# Patient Record
Sex: FEMALE | Race: OTHER | NOT HISPANIC OR LATINO | Employment: FULL TIME | URBAN - METROPOLITAN AREA
[De-identification: names, ages, dates, MRNs, and addresses within clinical notes are randomized per-mention and may not be internally consistent; named-entity substitution may affect disease eponyms.]

---

## 2022-06-22 ENCOUNTER — HOSPITAL ENCOUNTER (INPATIENT)
Facility: MEDICAL CENTER | Age: 32
LOS: 5 days | End: 2022-06-27
Attending: OBSTETRICS & GYNECOLOGY | Admitting: OBSTETRICS & GYNECOLOGY
Payer: COMMERCIAL

## 2022-06-22 DIAGNOSIS — G89.18 OTHER ACUTE POSTPROCEDURAL PAIN: ICD-10-CM

## 2022-06-22 PROBLEM — Z34.90 INTRAUTERINE PREGNANCY: Status: ACTIVE | Noted: 2022-06-22

## 2022-06-22 LAB
ALBUMIN SERPL BCP-MCNC: 3.6 G/DL (ref 3.2–4.9)
ALBUMIN/GLOB SERPL: 1.3 G/DL
ALP SERPL-CCNC: 152 U/L (ref 30–99)
ALT SERPL-CCNC: 11 U/L (ref 2–50)
ANION GAP SERPL CALC-SCNC: 15 MMOL/L (ref 7–16)
AST SERPL-CCNC: 16 U/L (ref 12–45)
BASOPHILS # BLD AUTO: 0.3 % (ref 0–1.8)
BASOPHILS # BLD: 0.04 K/UL (ref 0–0.12)
BILIRUB SERPL-MCNC: <0.2 MG/DL (ref 0.1–1.5)
BUN SERPL-MCNC: 9 MG/DL (ref 8–22)
CALCIUM SERPL-MCNC: 8 MG/DL (ref 8.5–10.5)
CHLORIDE SERPL-SCNC: 102 MMOL/L (ref 96–112)
CO2 SERPL-SCNC: 19 MMOL/L (ref 20–33)
CREAT SERPL-MCNC: 0.51 MG/DL (ref 0.5–1.4)
EOSINOPHIL # BLD AUTO: 0 K/UL (ref 0–0.51)
EOSINOPHIL NFR BLD: 0 % (ref 0–6.9)
ERYTHROCYTE [DISTWIDTH] IN BLOOD BY AUTOMATED COUNT: 40.1 FL (ref 35.9–50)
GFR SERPLBLD CREATININE-BSD FMLA CKD-EPI: 128 ML/MIN/1.73 M 2
GLOBULIN SER CALC-MCNC: 2.8 G/DL (ref 1.9–3.5)
GLUCOSE BLD STRIP.AUTO-MCNC: 144 MG/DL (ref 65–99)
GLUCOSE BLD STRIP.AUTO-MCNC: 164 MG/DL (ref 65–99)
GLUCOSE BLD STRIP.AUTO-MCNC: 167 MG/DL (ref 65–99)
GLUCOSE BLD STRIP.AUTO-MCNC: 192 MG/DL (ref 65–99)
GLUCOSE BLD STRIP.AUTO-MCNC: 222 MG/DL (ref 65–99)
GLUCOSE SERPL-MCNC: 127 MG/DL (ref 65–99)
HCT VFR BLD AUTO: 41.4 % (ref 37–47)
HGB BLD-MCNC: 13.7 G/DL (ref 12–16)
IMM GRANULOCYTES # BLD AUTO: 0.1 K/UL (ref 0–0.11)
IMM GRANULOCYTES NFR BLD AUTO: 0.6 % (ref 0–0.9)
LYMPHOCYTES # BLD AUTO: 1.38 K/UL (ref 1–4.8)
LYMPHOCYTES NFR BLD: 8.9 % (ref 22–41)
MAGNESIUM SERPL-MCNC: 5.5 MG/DL (ref 1.5–2.5)
MAGNESIUM SERPL-MCNC: 6.1 MG/DL (ref 1.5–2.5)
MAGNESIUM SERPL-MCNC: 7 MG/DL (ref 1.5–2.5)
MCH RBC QN AUTO: 27 PG (ref 27–33)
MCHC RBC AUTO-ENTMCNC: 33.1 G/DL (ref 33.6–35)
MCV RBC AUTO: 81.7 FL (ref 81.4–97.8)
MONOCYTES # BLD AUTO: 0.27 K/UL (ref 0–0.85)
MONOCYTES NFR BLD AUTO: 1.7 % (ref 0–13.4)
NEUTROPHILS # BLD AUTO: 13.69 K/UL (ref 2–7.15)
NEUTROPHILS NFR BLD: 88.5 % (ref 44–72)
NRBC # BLD AUTO: 0 K/UL
NRBC BLD-RTO: 0 /100 WBC
PLATELET # BLD AUTO: 194 K/UL (ref 164–446)
PMV BLD AUTO: 11.3 FL (ref 9–12.9)
POTASSIUM SERPL-SCNC: 3.7 MMOL/L (ref 3.6–5.5)
PROT SERPL-MCNC: 6.4 G/DL (ref 6–8.2)
RBC # BLD AUTO: 5.07 M/UL (ref 4.2–5.4)
SARS-COV-2 RNA RESP QL NAA+PROBE: NOTDETECTED
SODIUM SERPL-SCNC: 136 MMOL/L (ref 135–145)
SPECIMEN SOURCE: NORMAL
T PALLIDUM AB SER QL IA: NORMAL
WBC # BLD AUTO: 15.5 K/UL (ref 4.8–10.8)

## 2022-06-22 PROCEDURE — 302449 STATCHG TRIAGE ONLY (STATISTIC)

## 2022-06-22 PROCEDURE — 700102 HCHG RX REV CODE 250 W/ 637 OVERRIDE(OP): Performed by: OBSTETRICS & GYNECOLOGY

## 2022-06-22 PROCEDURE — U0005 INFEC AGEN DETEC AMPLI PROBE: HCPCS

## 2022-06-22 PROCEDURE — 83735 ASSAY OF MAGNESIUM: CPT

## 2022-06-22 PROCEDURE — 86780 TREPONEMA PALLIDUM: CPT

## 2022-06-22 PROCEDURE — 700105 HCHG RX REV CODE 258: Performed by: OBSTETRICS & GYNECOLOGY

## 2022-06-22 PROCEDURE — 96365 THER/PROPH/DIAG IV INF INIT: CPT

## 2022-06-22 PROCEDURE — 80053 COMPREHEN METABOLIC PANEL: CPT

## 2022-06-22 PROCEDURE — 82962 GLUCOSE BLOOD TEST: CPT

## 2022-06-22 PROCEDURE — 85025 COMPLETE CBC W/AUTO DIFF WBC: CPT

## 2022-06-22 PROCEDURE — 700111 HCHG RX REV CODE 636 W/ 250 OVERRIDE (IP): Performed by: OBSTETRICS & GYNECOLOGY

## 2022-06-22 PROCEDURE — 700111 HCHG RX REV CODE 636 W/ 250 OVERRIDE (IP)

## 2022-06-22 PROCEDURE — A9270 NON-COVERED ITEM OR SERVICE: HCPCS | Performed by: OBSTETRICS & GYNECOLOGY

## 2022-06-22 PROCEDURE — 36415 COLL VENOUS BLD VENIPUNCTURE: CPT

## 2022-06-22 PROCEDURE — U0003 INFECTIOUS AGENT DETECTION BY NUCLEIC ACID (DNA OR RNA); SEVERE ACUTE RESPIRATORY SYNDROME CORONAVIRUS 2 (SARS-COV-2) (CORONAVIRUS DISEASE [COVID-19]), AMPLIFIED PROBE TECHNIQUE, MAKING USE OF HIGH THROUGHPUT TECHNOLOGIES AS DESCRIBED BY CMS-2020-01-R: HCPCS

## 2022-06-22 PROCEDURE — 302790 HCHG STAT ANTEPARTUM CARE, DAILY

## 2022-06-22 PROCEDURE — 770002 HCHG ROOM/CARE - OB PRIVATE (112)

## 2022-06-22 RX ORDER — VITAMIN A ACETATE, BETA CAROTENE, ASCORBIC ACID, CHOLECALCIFEROL, .ALPHA.-TOCOPHEROL ACETATE, DL-, THIAMINE MONONITRATE, RIBOFLAVIN, NIACINAMIDE, PYRIDOXINE HYDROCHLORIDE, FOLIC ACID, CYANOCOBALAMIN, CALCIUM CARBONATE, FERROUS FUMARATE, ZINC OXIDE, CUPRIC OXIDE 3080; 12; 120; 400; 1; 1.84; 3; 20; 22; 920; 25; 200; 27; 10; 2 [IU]/1; UG/1; MG/1; [IU]/1; MG/1; MG/1; MG/1; MG/1; MG/1; [IU]/1; MG/1; MG/1; MG/1; MG/1; MG/1
1 TABLET, FILM COATED ORAL
Status: DISCONTINUED | OUTPATIENT
Start: 2022-06-22 | End: 2022-06-27 | Stop reason: HOSPADM

## 2022-06-22 RX ORDER — VITAMIN A ACETATE, BETA CAROTENE, ASCORBIC ACID, CHOLECALCIFEROL, .ALPHA.-TOCOPHEROL ACETATE, DL-, THIAMINE MONONITRATE, RIBOFLAVIN, NIACINAMIDE, PYRIDOXINE HYDROCHLORIDE, FOLIC ACID, CYANOCOBALAMIN, CALCIUM CARBONATE, FERROUS FUMARATE, ZINC OXIDE, CUPRIC OXIDE 3080; 12; 120; 400; 1; 1.84; 3; 20; 22; 920; 25; 200; 27; 10; 2 [IU]/1; UG/1; MG/1; [IU]/1; MG/1; MG/1; MG/1; MG/1; MG/1; [IU]/1; MG/1; MG/1; MG/1; MG/1; MG/1
1 TABLET, FILM COATED ORAL
Status: DISCONTINUED | OUTPATIENT
Start: 2022-06-22 | End: 2022-06-22

## 2022-06-22 RX ORDER — ONDANSETRON 2 MG/ML
INJECTION INTRAMUSCULAR; INTRAVENOUS
Status: COMPLETED
Start: 2022-06-22 | End: 2022-06-22

## 2022-06-22 RX ORDER — INSULIN LISPRO 100 [IU]/ML
6 INJECTION, SOLUTION INTRAVENOUS; SUBCUTANEOUS ONCE
Status: COMPLETED | OUTPATIENT
Start: 2022-06-22 | End: 2022-06-22

## 2022-06-22 RX ORDER — DOCUSATE SODIUM 100 MG/1
100 CAPSULE, LIQUID FILLED ORAL DAILY
Status: DISCONTINUED | OUTPATIENT
Start: 2022-06-22 | End: 2022-06-23

## 2022-06-22 RX ORDER — ONDANSETRON 2 MG/ML
4 INJECTION INTRAMUSCULAR; INTRAVENOUS EVERY 6 HOURS PRN
Status: DISCONTINUED | OUTPATIENT
Start: 2022-06-22 | End: 2022-06-27 | Stop reason: HOSPADM

## 2022-06-22 RX ORDER — BETAMETHASONE SODIUM PHOSPHATE AND BETAMETHASONE ACETATE 3; 3 MG/ML; MG/ML
12 INJECTION, SUSPENSION INTRA-ARTICULAR; INTRALESIONAL; INTRAMUSCULAR; SOFT TISSUE ONCE
Status: COMPLETED | OUTPATIENT
Start: 2022-06-23 | End: 2022-06-23

## 2022-06-22 RX ORDER — MAGNESIUM SULFATE HEPTAHYDRATE 40 MG/ML
1 INJECTION, SOLUTION INTRAVENOUS CONTINUOUS
Status: DISCONTINUED | OUTPATIENT
Start: 2022-06-22 | End: 2022-06-24

## 2022-06-22 RX ORDER — CALCIUM GLUCONATE 94 MG/ML
1 INJECTION, SOLUTION INTRAVENOUS
Status: DISCONTINUED | OUTPATIENT
Start: 2022-06-22 | End: 2022-06-24

## 2022-06-22 RX ORDER — MENTHOL AND METHYL SALICYLATE 7.6; 29 G/100G; G/100G
OINTMENT TOPICAL PRN
Status: DISCONTINUED | OUTPATIENT
Start: 2022-06-22 | End: 2022-06-27 | Stop reason: HOSPADM

## 2022-06-22 RX ORDER — SODIUM CHLORIDE, SODIUM LACTATE, POTASSIUM CHLORIDE, CALCIUM CHLORIDE 600; 310; 30; 20 MG/100ML; MG/100ML; MG/100ML; MG/100ML
INJECTION, SOLUTION INTRAVENOUS CONTINUOUS
Status: DISCONTINUED | OUTPATIENT
Start: 2022-06-22 | End: 2022-06-25

## 2022-06-22 RX ORDER — DOCUSATE SODIUM 100 MG/1
100 CAPSULE, LIQUID FILLED ORAL 2 TIMES DAILY
Status: DISCONTINUED | OUTPATIENT
Start: 2022-06-22 | End: 2022-06-22

## 2022-06-22 RX ORDER — INSULIN LISPRO 100 [IU]/ML
10 INJECTION, SOLUTION INTRAVENOUS; SUBCUTANEOUS ONCE
Status: COMPLETED | OUTPATIENT
Start: 2022-06-22 | End: 2022-06-22

## 2022-06-22 RX ORDER — AMOXICILLIN 250 MG/1
250 CAPSULE ORAL EVERY 8 HOURS
Status: DISCONTINUED | OUTPATIENT
Start: 2022-06-24 | End: 2022-06-27 | Stop reason: HOSPADM

## 2022-06-22 RX ADMIN — DOXYLAMINE SUCCINATE 25 MG: 25 TABLET ORAL at 20:08

## 2022-06-22 RX ADMIN — ONDANSETRON 4 MG: 2 INJECTION INTRAMUSCULAR; INTRAVENOUS at 11:49

## 2022-06-22 RX ADMIN — DOCUSATE SODIUM 100 MG: 100 CAPSULE, LIQUID FILLED ORAL at 09:15

## 2022-06-22 RX ADMIN — INSULIN LISPRO 6 UNITS: 100 INJECTION, SOLUTION INTRAVENOUS; SUBCUTANEOUS at 09:21

## 2022-06-22 RX ADMIN — SODIUM CHLORIDE, POTASSIUM CHLORIDE, SODIUM LACTATE AND CALCIUM CHLORIDE 1000 ML: 600; 310; 30; 20 INJECTION, SOLUTION INTRAVENOUS at 18:46

## 2022-06-22 RX ADMIN — AMPICILLIN SODIUM 2000 MG: 2 INJECTION, POWDER, FOR SOLUTION INTRAMUSCULAR; INTRAVENOUS at 20:02

## 2022-06-22 RX ADMIN — INSULIN LISPRO 6 UNITS: 100 INJECTION, SOLUTION INTRAVENOUS; SUBCUTANEOUS at 20:01

## 2022-06-22 RX ADMIN — MAGNESIUM SULFATE HEPTAHYDRATE 2 G/HR: 40 INJECTION, SOLUTION INTRAVENOUS at 22:56

## 2022-06-22 RX ADMIN — INSULIN LISPRO 6 UNITS: 100 INJECTION, SOLUTION INTRAVENOUS; SUBCUTANEOUS at 15:50

## 2022-06-22 RX ADMIN — INSULIN LISPRO 10 UNITS: 100 INJECTION, SOLUTION INTRAVENOUS; SUBCUTANEOUS at 10:45

## 2022-06-22 RX ADMIN — SODIUM CHLORIDE, POTASSIUM CHLORIDE, SODIUM LACTATE AND CALCIUM CHLORIDE 1000 ML: 600; 310; 30; 20 INJECTION, SOLUTION INTRAVENOUS at 04:56

## 2022-06-22 RX ADMIN — AMPICILLIN SODIUM 2000 MG: 2 INJECTION, POWDER, FOR SOLUTION INTRAMUSCULAR; INTRAVENOUS at 09:15

## 2022-06-22 RX ADMIN — PRENATAL WITH FERROUS FUM AND FOLIC ACID 1 TABLET: 3080; 920; 120; 400; 22; 1.84; 3; 20; 10; 1; 12; 200; 27; 25; 2 TABLET ORAL at 20:02

## 2022-06-22 RX ADMIN — MAGNESIUM SULFATE HEPTAHYDRATE 2 G/HR: 40 INJECTION, SOLUTION INTRAVENOUS at 05:06

## 2022-06-22 RX ADMIN — AMPICILLIN SODIUM 2000 MG: 2 INJECTION, POWDER, FOR SOLUTION INTRAMUSCULAR; INTRAVENOUS at 14:12

## 2022-06-22 ASSESSMENT — PATIENT HEALTH QUESTIONNAIRE - PHQ9
1. LITTLE INTEREST OR PLEASURE IN DOING THINGS: NOT AT ALL
SUM OF ALL RESPONSES TO PHQ9 QUESTIONS 1 AND 2: 0
1. LITTLE INTEREST OR PLEASURE IN DOING THINGS: NOT AT ALL
2. FEELING DOWN, DEPRESSED, IRRITABLE, OR HOPELESS: NOT AT ALL
2. FEELING DOWN, DEPRESSED, IRRITABLE, OR HOPELESS: NOT AT ALL
SUM OF ALL RESPONSES TO PHQ9 QUESTIONS 1 AND 2: 0

## 2022-06-22 NOTE — H&P
DATE OF ADMISSION:  2022     REFERRING PHYSICIAN:  Nannette Malcolm MD     REFERRING HOSPITAL:  [facility].     MODE OF TRANSPORTATION:  Surface ambulance.     REASON FOR TRANSFER:   premature rupture of membranes.     HISTORY OF PRESENT ILLNESS:  This is a 32-year-old , EDC 2022,   currently at 31 weeks and 6 days who was transferred for spontaneous rupture   of membranes at approximately midnight today.  The patient reports all was   well until she had spontaneous rupture of membranes, clear fluid.  AmniSure   positive. Visual passage of fluid per cervix was documented by Dr. Malcolm.     The patient is known to me, has been following with her due to short cervix   and has been on vaginal progesterone therapy until last night.     PAST MEDICAL HISTORY:  Gestational diabetes, diet controlled at this time.    She denies any other major medical problems including asthma, seizures,   hypertension, cardiovascular, GI, or  diseases.     PREVIOUS OPERATIONS:  Breast augmentation.     TRANSFUSIONS:  None.     ALLERGIES:  None.     VENEREAL DISEASE HISTORY:  Negative.     HABITS:  None.     MEDICATIONS:  Unisom and vitamin B6 p.r.n. nausea.     PAST OBSTETRICAL HISTORY:  In 2017, 39-week male, 6 pound 4 ounce,  vacuum   extraction.     PHYSICAL EXAMINATION:  GENERAL:  Well-developed, well-nourished female, alert and oriented x3.  She   is afebrile.  VITAL SIGNS:  Otherwise within normal limits.  HEENT:  Normocephalic, somewhat gaunt-looking female.  Pupils equal, react to   light and accommodation.  Extraocular movements symmetrical.  Ear, nose and   throat exam within normal limits.  LUNGS:  Clear.  HEART:  Regular rate and rhythm, normal S1 and S2.  No S3, S4 or murmurs.  ABDOMEN:  Soft, gravid uterus.  She is mariano periodically.  Fetal heart   rate tracing is reactive.  No decelerations at this time and moderate   variability.  EXTREMITIES:  No edema or varicosities.  PELVIC:   Deferred at this time.  Per Dr. Malcolm cervix is closed.     DIAGNOSTIC DATA:  Bedside ultrasound reveals rose fetus, vertex, MORTEZA is   11.5, adjusted ultrasound age 32 weeks and 0 days, which is consistent with   her LMP date.  Abdominal circumference, however, is at the 9th percentile.    MORTEZA is normal at 11.51.     ASSESSMENT:  1.  Intrauterine pregnancy 31 weeks and 6 days.  2.   premature rupture of membranes.  3.  Possible IUGR.  4.  Gestational diabetes, diet controlled.     PLAN:  The patient placed on an 1800-calorie diet and will be monitored   fasting 1-hour post-meals.  The primary goal is to complete the 48 hours of   steroids. We will leave her on magnesium sulfate for neuro protection until   this has been completed.  After 48 hours, she will be taken off the magnesium.    She received latency antibiotics.  She has already received azithromycin and   now is on ampicillin 2 grams every 6 hours and will be for 48 hours and will   be transitioned to amoxicillin 250 every 8 hours for 5 days.     The patient has been counseled on the situation and complications associated   with  premature rupture of membranes and the likelihood of    birth.   consultation will be provided for them.  Primary goal is 48   hours of steroids, secondary goal up to 34-35 weeks.  All questions answered   to her satisfaction.    Time: 90 minutes        ______________________________  MD ADOLPH OROZCO/CORNELIA    DD:  2022 06:42  DT:  2022 07:16    Job#:  [id number]

## 2022-06-22 NOTE — PROGRESS NOTES
1420: Report received from Lily MARTINEZ RN. POC discussed.     1535: Dr. Arreaga notified and given report on pt. MD ordering 6 units of humalog now.     1450: Dr. Nassar, neonatologist, at bedside. Discussed POC with pt and family.    1900: Report given to Rashmi VELAZQUEZ RN. POC discussed.

## 2022-06-22 NOTE — CONSULTS
NICU Consult    I was asked to consult on Ms. Marques today. She is a 32 year old  at 31 weeks and 6 days gestation whose pregnancy was complicated by  premature rupture of membranes, possible IUGR, and gestational diabetes (diet controlled). I met with Ms. Marques and her child's father today. They are expecting a baby boy. Obstetric plan is betamethasone, magnesium sulfate for neuroprotection, and latency antibiotics.     Today we discussed the average length of stay in the NICU, the differences in delivery room management for a premature infant, the possibility for non-invasive or mechanical ventilation for significant respiratory support, the overall nutritional strategy for infant's of this gestational age, including slow ramping up of enteral feeds. Mother does plan to breastfeed, which I supported and encouraged, and we discussed the importance of breast milk/donor breastmilk to premature infants.    All questions were answered. ?     NICU remains available should further questions or concerns arise.    Laurie Nassar MD  Neonatologist

## 2022-06-22 NOTE — PROGRESS NOTES
0700: Received report from PM RN. Care assumed.     0730: Initial AM assessment.  Vital signs taken per policy.The following assessments were performed every hour during the maintenance magnesium infusion with the following findings unless otherwise specified:   Maternal vital signs - stable   Fetal Well-being - reassuring   Level of consciousness- A&Ox4  Bilateral breath sounds- clear to auscultation  Respirations - regular and unlabored  Deep tendon reflexes - +2  Clonus - absent  Edema - absent   IV - clean, dry, intact, with no indications of infiltration/phlebitis     0837: Fasting FSBS 144. Dave PENA notified. Orders received for 6 units of humalog with BF     0921: Insulin given per orders. See MAR    1034: 1 hr PP FSBS 222. Dave PENA notified. Discussed pt concerns about food and ambulation. The following orders received:  -10 units humalog now.  -pt may bring own food but have consult with diabetes educator  -pt must have x2 assistance to BR to BM while on magnesium, or else use a bedpan  -Unisom HS daily     1045: Insulin given. See MAR    1130: Pt calling out with vomiting. RN to room to assess pt. FSBS 192. DTRs +2. Dave notified. Order received for PRN zofran. Zofran given to patient. CNA assisted pt change pad and gown. Clear fluid noted on pad-- pt reported gush with vomiting. Per Dave PENA, Arreaga MD will be covering from now until Sunday.       1420: Report given to Nan LACY. Care relinquished.

## 2022-06-22 NOTE — DIETARY
Nutrition Services: Gestational Diabetes Education Consult   Day 0 of admit.  Dolores Marques is a 32 y.o. female with admitting DX of Intrauterine pregnancy [Z34.90]    RD able to visit pt at bedside to provide Gestational diabetes education. Pt will be inpatient until delivery therefore carbohydrate counting education was unecessary at this time as pt will be receiving most foods from the hospital. RD discussed importance of frequent balanced meals, adequate protein given vegetarian diet and brief education on portion sizes as pt mother will be bringing in lunch and dinner beginning on Friday. Pt demonstrated readiness and verbalized evidence of learning. RD able to answer all questions to patient's satisfaction. Will order Boost Glucose Control to help bolster protein provision.     RD adjusted diet to an 1800 calorie diet based on nutritional recommendations using MSJ.     No other education needs identified at this time. Consider referral to outpatient nutrition services for continuation of education as indicated or per pt preferences.     Please re-consult RD as indicated.

## 2022-06-22 NOTE — PROGRESS NOTES
0420- Report received from EMS. Care of patient assumed at this time.     0425- Dr. Acosta notified of patient's arrival. Orders received.     0615- Dr. Acosta updated on patient mariano about every 3-5 minutes on arrival, but no contractions per toco over the last hour.     0620- Dr. Acosta at bedside for ultrasound. Estimated fetal weight 1753 grams. Aware of magnesium level of 5.5.     0700- Report given to BAMBI Bautista. Care of patient relinquished at this time.

## 2022-06-23 LAB
GLUCOSE BLD STRIP.AUTO-MCNC: 121 MG/DL (ref 65–99)
GLUCOSE BLD STRIP.AUTO-MCNC: 168 MG/DL (ref 65–99)
GLUCOSE BLD STRIP.AUTO-MCNC: 211 MG/DL (ref 65–99)
MAGNESIUM SERPL-MCNC: 5.7 MG/DL (ref 1.5–2.5)
MAGNESIUM SERPL-MCNC: 6.8 MG/DL (ref 1.5–2.5)
MAGNESIUM SERPL-MCNC: 6.9 MG/DL (ref 1.5–2.5)
MAGNESIUM SERPL-MCNC: 7 MG/DL (ref 1.5–2.5)
MAGNESIUM SERPL-MCNC: 7 MG/DL (ref 1.5–2.5)

## 2022-06-23 PROCEDURE — 700105 HCHG RX REV CODE 258: Performed by: OBSTETRICS & GYNECOLOGY

## 2022-06-23 PROCEDURE — 302790 HCHG STAT ANTEPARTUM CARE, DAILY

## 2022-06-23 PROCEDURE — 82962 GLUCOSE BLOOD TEST: CPT | Mod: 91

## 2022-06-23 PROCEDURE — 700102 HCHG RX REV CODE 250 W/ 637 OVERRIDE(OP): Performed by: OBSTETRICS & GYNECOLOGY

## 2022-06-23 PROCEDURE — 83735 ASSAY OF MAGNESIUM: CPT | Mod: 91

## 2022-06-23 PROCEDURE — 36415 COLL VENOUS BLD VENIPUNCTURE: CPT

## 2022-06-23 PROCEDURE — 770002 HCHG ROOM/CARE - OB PRIVATE (112)

## 2022-06-23 PROCEDURE — 700111 HCHG RX REV CODE 636 W/ 250 OVERRIDE (IP): Performed by: OBSTETRICS & GYNECOLOGY

## 2022-06-23 PROCEDURE — A9270 NON-COVERED ITEM OR SERVICE: HCPCS | Performed by: OBSTETRICS & GYNECOLOGY

## 2022-06-23 RX ORDER — INSULIN LISPRO 100 [IU]/ML
6 INJECTION, SOLUTION INTRAVENOUS; SUBCUTANEOUS
Status: DISCONTINUED | OUTPATIENT
Start: 2022-06-23 | End: 2022-06-23

## 2022-06-23 RX ORDER — DOCUSATE SODIUM 100 MG/1
100 CAPSULE, LIQUID FILLED ORAL 2 TIMES DAILY
Status: DISCONTINUED | OUTPATIENT
Start: 2022-06-23 | End: 2022-06-25

## 2022-06-23 RX ORDER — DOCUSATE SODIUM 100 MG/1
100 CAPSULE, LIQUID FILLED ORAL
Status: DISCONTINUED | OUTPATIENT
Start: 2022-06-23 | End: 2022-06-23

## 2022-06-23 RX ORDER — INSULIN LISPRO 100 [IU]/ML
6 INJECTION, SOLUTION INTRAVENOUS; SUBCUTANEOUS
Status: DISCONTINUED | OUTPATIENT
Start: 2022-06-23 | End: 2022-06-24

## 2022-06-23 RX ADMIN — AMPICILLIN SODIUM 2000 MG: 2 INJECTION, POWDER, FOR SOLUTION INTRAMUSCULAR; INTRAVENOUS at 07:42

## 2022-06-23 RX ADMIN — SODIUM CHLORIDE, POTASSIUM CHLORIDE, SODIUM LACTATE AND CALCIUM CHLORIDE 1000 ML: 600; 310; 30; 20 INJECTION, SOLUTION INTRAVENOUS at 23:25

## 2022-06-23 RX ADMIN — AMPICILLIN SODIUM 2000 MG: 2 INJECTION, POWDER, FOR SOLUTION INTRAMUSCULAR; INTRAVENOUS at 20:08

## 2022-06-23 RX ADMIN — DOCUSATE SODIUM 100 MG: 100 CAPSULE, LIQUID FILLED ORAL at 11:51

## 2022-06-23 RX ADMIN — AMPICILLIN SODIUM 2000 MG: 2 INJECTION, POWDER, FOR SOLUTION INTRAMUSCULAR; INTRAVENOUS at 14:02

## 2022-06-23 RX ADMIN — AMPICILLIN SODIUM 2000 MG: 2 INJECTION, POWDER, FOR SOLUTION INTRAMUSCULAR; INTRAVENOUS at 02:13

## 2022-06-23 RX ADMIN — DOXYLAMINE SUCCINATE 25 MG: 25 TABLET ORAL at 21:28

## 2022-06-23 RX ADMIN — BETAMETHASONE SODIUM PHOSPHATE AND BETAMETHASONE ACETATE 12 MG: 3; 3 INJECTION, SUSPENSION INTRA-ARTICULAR; INTRALESIONAL; INTRAMUSCULAR at 02:13

## 2022-06-23 RX ADMIN — PRENATAL WITH FERROUS FUM AND FOLIC ACID 1 TABLET: 3080; 920; 120; 400; 22; 1.84; 3; 20; 10; 1; 12; 200; 27; 25; 2 TABLET ORAL at 20:07

## 2022-06-23 RX ADMIN — INSULIN LISPRO 6 UNITS: 100 INJECTION, SOLUTION INTRAVENOUS; SUBCUTANEOUS at 09:56

## 2022-06-23 NOTE — CONSULTS
Diabetes education: Pt is newly dx with Gestational diabetes, and per pt had not yet been to class or scheduled class before water broke. Pt to be in hospital until delivery, per note.  Pt was admitted with blood sugar of 127, but  once started on steroids, blood sugars were 144, and 222.  Pt had received Lispro per orders for elevated blood sugars yesterday and will start  Lispro 6 units tid meals when FSBG greater than 160 per MAR.  Met with pt and SO last night. GDM reviewed to include difference between type 2 and GDM, risk for type 2 diabetes, importance of 3 meals and 3 snacks with controlled CHO and protein and why ( pt had met with RD to discuss meal plan specifics), goals for blood sugars and what effects blood sugars . Questions answered.  Plan: As pt will remain in house until delivery, no further educations needs at this time. Please send text or reorder education if needs change.

## 2022-06-23 NOTE — PROGRESS NOTES
Dolores Marques   32w0d  Admission DX: PPROM    Date of Admission: 6/22/2022  Patient Active Problem List    Diagnosis Date Noted   • Intrauterine pregnancy 06/22/2022       Subjective: uterine contractions:no, pain: .no, LOF: yes, Vaginal Bleeding: no    Complaints: .no headache: .no: Blurred vision:.no SOB: .no nausea/vomiting: .noepigastric pain:.no:fetal movement: normal    Objective:   Vitals:    06/23/22 0518 06/23/22 0613 06/23/22 0616 06/23/22 0618   BP:   109/59    Pulse: 92 96 98 92   Resp:       Temp:       TempSrc:       SpO2: 95% 96%  93%   Weight:       Height:         Fetal Non-stress Test: reactive  Cervical: deferred ;  Membranes: ruptured: .yes  Abdomen: gravid, soft, NT  Ext: SCDs on, NT    Meds:     Current Facility-Administered Medications:   •  insulin lispro (AdmeLOG,HumaLOG) injection, 6 Units, Subcutaneous, TID AC, Amara Arreaga M.D.  •  docusate sodium (COLACE) capsule 100 mg, 100 mg, Oral, BID, Amara Arreaga M.D.  •  lactated ringers infusion, , Intravenous, Continuous, Morteza Acosta M.D., Last Rate: 75 mL/hr at 06/22/22 1846, 1,000 mL at 06/22/22 1846  •  calcium GLUConate injection 1 g, 1 g, Intravenous, Once PRN, Morteza Acosta M.D.  •  magnesium sulfate 40 g/1000mL infusion, 2 g/hr, Intravenous, Continuous, Morteza Acosta M.D., Last Rate: 50 mL/hr at 06/22/22 2256, 2 g/hr at 06/22/22 2256  •  ampicillin (Omnipen) 2,000 mg in  mL IVPB, 2,000 mg, Intravenous, Q6HRS, Morteza Acosta M.D., Stopped at 06/23/22 0243  •  [START ON 6/24/2022] amoxicillin (AMOXIL) capsule 250 mg, 250 mg, Oral, Q8HRS, Morteza Acosta M.D.  •  prenatal plus vitamin (STUARTNATAL 1+1) 27-1 MG tablet 1 Tablet, 1 Tablet, Oral, Daily-0800, Morteza Acosta M.D., 1 Tablet at 06/22/22 2002  •  doxylamine (UNISOM) tablet 25 mg, 25 mg, Oral, QHS, Morteza Acosta M.D., 25 mg at 06/22/22 2008  •  ondansetron (ZOFRAN) syringe/vial injection 4 mg, 4 mg, Intravenous, Q6HRS PRN, Morteza Acosta M.D., 4 mg at 06/22/22 1149  •  Icy Hot Balm Extra  Strength 7.6-29 % topical ointment, , Topical, PRN, Amara Arreaga M.D.    Labs:    Lab:   Recent Results (from the past 72 hour(s))   SARS-CoV-2, PCR (In-House)    Collection Time: 06/22/22  5:20 AM   Result Value Ref Range    SARS-CoV-2 Source NP Swab     SARS-CoV-2 by PCR NotDetected    CBC WITH DIFFERENTIAL    Collection Time: 06/22/22  5:21 AM   Result Value Ref Range    WBC 15.5 (H) 4.8 - 10.8 K/uL    RBC 5.07 4.20 - 5.40 M/uL    Hemoglobin 13.7 12.0 - 16.0 g/dL    Hematocrit 41.4 37.0 - 47.0 %    MCV 81.7 81.4 - 97.8 fL    MCH 27.0 27.0 - 33.0 pg    MCHC 33.1 (L) 33.6 - 35.0 g/dL    RDW 40.1 35.9 - 50.0 fL    Platelet Count 194 164 - 446 K/uL    MPV 11.3 9.0 - 12.9 fL    Neutrophils-Polys 88.50 (H) 44.00 - 72.00 %    Lymphocytes 8.90 (L) 22.00 - 41.00 %    Monocytes 1.70 0.00 - 13.40 %    Eosinophils 0.00 0.00 - 6.90 %    Basophils 0.30 0.00 - 1.80 %    Immature Granulocytes 0.60 0.00 - 0.90 %    Nucleated RBC 0.00 /100 WBC    Neutrophils (Absolute) 13.69 (H) 2.00 - 7.15 K/uL    Lymphs (Absolute) 1.38 1.00 - 4.80 K/uL    Monos (Absolute) 0.27 0.00 - 0.85 K/uL    Eos (Absolute) 0.00 0.00 - 0.51 K/uL    Baso (Absolute) 0.04 0.00 - 0.12 K/uL    Immature Granulocytes (abs) 0.10 0.00 - 0.11 K/uL    NRBC (Absolute) 0.00 K/uL   T.PALLIDUM AB EIA    Collection Time: 06/22/22  5:21 AM   Result Value Ref Range    Syphilis, Treponemal Qual Non-Reactive Non-Reactive   MAGNESIUM    Collection Time: 06/22/22  5:21 AM   Result Value Ref Range    Magnesium 5.5 (H) 1.5 - 2.5 mg/dL   Comp Metabolic Panel    Collection Time: 06/22/22  5:21 AM   Result Value Ref Range    Sodium 136 135 - 145 mmol/L    Potassium 3.7 3.6 - 5.5 mmol/L    Chloride 102 96 - 112 mmol/L    Co2 19 (L) 20 - 33 mmol/L    Anion Gap 15.0 7.0 - 16.0    Glucose 127 (H) 65 - 99 mg/dL    Bun 9 8 - 22 mg/dL    Creatinine 0.51 0.50 - 1.40 mg/dL    Calcium 8.0 (L) 8.5 - 10.5 mg/dL    AST(SGOT) 16 12 - 45 U/L    ALT(SGPT) 11 2 - 50 U/L    Alkaline Phosphatase 152 (H)  30 - 99 U/L    Total Bilirubin <0.2 0.1 - 1.5 mg/dL    Albumin 3.6 3.2 - 4.9 g/dL    Total Protein 6.4 6.0 - 8.2 g/dL    Globulin 2.8 1.9 - 3.5 g/dL    A-G Ratio 1.3 g/dL   ESTIMATED GFR    Collection Time: 22  5:21 AM   Result Value Ref Range    GFR (CKD-EPI) 128 >60 mL/min/1.73 m 2   POCT glucose device results    Collection Time: 22  8:37 AM   Result Value Ref Range    POC Glucose, Blood 144 (H) 65 - 99 mg/dL   POCT glucose device results    Collection Time: 22 10:34 AM   Result Value Ref Range    POC Glucose, Blood 222 (H) 65 - 99 mg/dL   SERUM MAGNESIUM LEVELS    Collection Time: 22 11:08 AM   Result Value Ref Range    Magnesium 6.1 (H) 1.5 - 2.5 mg/dL   POCT glucose device results    Collection Time: 22 11:27 AM   Result Value Ref Range    POC Glucose, Blood 192 (H) 65 - 99 mg/dL   POCT glucose device results    Collection Time: 22  3:27 PM   Result Value Ref Range    POC Glucose, Blood 164 (H) 65 - 99 mg/dL   SERUM MAGNESIUM LEVELS    Collection Time: 22  5:39 PM   Result Value Ref Range    Magnesium 7.0 (H) 1.5 - 2.5 mg/dL   POCT glucose device results    Collection Time: 22  7:29 PM   Result Value Ref Range    POC Glucose, Blood 167 (H) 65 - 99 mg/dL   SERUM MAGNESIUM LEVELS    Collection Time: 22 11:09 PM   Result Value Ref Range    Magnesium 6.8 (H) 1.5 - 2.5 mg/dL   SERUM MAGNESIUM LEVELS    Collection Time: 22  5:10 AM   Result Value Ref Range    Magnesium 7.0 (H) 1.5 - 2.5 mg/dL   POCT glucose device results    Collection Time: 22  6:51 AM   Result Value Ref Range    POC Glucose, Blood 168 (H) 65 - 99 mg/dL       Assessment:  32 y.o.  @ 32w0d  PPROM  A1GDM  Possible IUGR    Plan:  Pt has rec'd 2nd dose of BMZ this AM  Keep MgSO4 on x 24 hours  Continue antibiotics  Plan of care discussed with patient and  with questions answered

## 2022-06-24 LAB
GLUCOSE BLD STRIP.AUTO-MCNC: 110 MG/DL (ref 65–99)
GLUCOSE BLD STRIP.AUTO-MCNC: 138 MG/DL (ref 65–99)
GLUCOSE BLD STRIP.AUTO-MCNC: 152 MG/DL (ref 65–99)
GLUCOSE BLD STRIP.AUTO-MCNC: 173 MG/DL (ref 65–99)

## 2022-06-24 PROCEDURE — 700102 HCHG RX REV CODE 250 W/ 637 OVERRIDE(OP): Performed by: OBSTETRICS & GYNECOLOGY

## 2022-06-24 PROCEDURE — 700105 HCHG RX REV CODE 258: Performed by: OBSTETRICS & GYNECOLOGY

## 2022-06-24 PROCEDURE — 302790 HCHG STAT ANTEPARTUM CARE, DAILY

## 2022-06-24 PROCEDURE — 82962 GLUCOSE BLOOD TEST: CPT | Mod: 91

## 2022-06-24 PROCEDURE — 700111 HCHG RX REV CODE 636 W/ 250 OVERRIDE (IP): Performed by: OBSTETRICS & GYNECOLOGY

## 2022-06-24 PROCEDURE — A9270 NON-COVERED ITEM OR SERVICE: HCPCS | Performed by: OBSTETRICS & GYNECOLOGY

## 2022-06-24 PROCEDURE — 770002 HCHG ROOM/CARE - OB PRIVATE (112)

## 2022-06-24 PROCEDURE — 59025 FETAL NON-STRESS TEST: CPT

## 2022-06-24 RX ORDER — INSULIN LISPRO 100 [IU]/ML
6 INJECTION, SOLUTION INTRAVENOUS; SUBCUTANEOUS
Status: DISCONTINUED | OUTPATIENT
Start: 2022-06-25 | End: 2022-06-25

## 2022-06-24 RX ADMIN — DOXYLAMINE SUCCINATE 25 MG: 25 TABLET ORAL at 20:59

## 2022-06-24 RX ADMIN — AMPICILLIN SODIUM 2000 MG: 2 INJECTION, POWDER, FOR SOLUTION INTRAMUSCULAR; INTRAVENOUS at 02:10

## 2022-06-24 RX ADMIN — INSULIN LISPRO 6 UNITS: 100 INJECTION, SOLUTION INTRAVENOUS; SUBCUTANEOUS at 22:43

## 2022-06-24 RX ADMIN — PRENATAL WITH FERROUS FUM AND FOLIC ACID 1 TABLET: 3080; 920; 120; 400; 22; 1.84; 3; 20; 10; 1; 12; 200; 27; 25; 2 TABLET ORAL at 20:59

## 2022-06-24 RX ADMIN — AMOXICILLIN 250 MG: 250 CAPSULE ORAL at 09:05

## 2022-06-24 RX ADMIN — DOCUSATE SODIUM 100 MG: 100 CAPSULE, LIQUID FILLED ORAL at 09:05

## 2022-06-24 RX ADMIN — AMOXICILLIN 250 MG: 250 CAPSULE ORAL at 16:50

## 2022-06-24 ASSESSMENT — PATIENT HEALTH QUESTIONNAIRE - PHQ9
2. FEELING DOWN, DEPRESSED, IRRITABLE, OR HOPELESS: NOT AT ALL
1. LITTLE INTEREST OR PLEASURE IN DOING THINGS: NOT AT ALL
SUM OF ALL RESPONSES TO PHQ9 QUESTIONS 1 AND 2: 0

## 2022-06-24 NOTE — PROGRESS NOTES
Dolores Marques   32w1d  Admission DX: Intrauterine pregnancy [Z34.90]    Date of Admission: 6/22/2022  Patient Active Problem List    Diagnosis Date Noted   • Intrauterine pregnancy 06/22/2022       Subjective: uterine contractions:yes but not painful, pain: .no, LOF: yes, Vaginal Bleeding: yes - spotting this am when wiping x 1    Complaints: .no headache: .no: Blurred vision:.no SOB: .no nausea/vomiting: .noepigastric pain:.no:fetal movement: normal    Objective:   Vitals:    06/23/22 1800 06/23/22 1915 06/23/22 2350 06/24/22 0400   BP: 123/70 118/73  (!) 97/50   Pulse: 93 86  80   Resp:  16 17 17   Temp:  36.3 °C (97.4 °F) 36.3 °C (97.4 °F) 36.4 °C (97.5 °F)   TempSrc:  Temporal Temporal Temporal   SpO2: 96% 99%     Weight:       Height:         Fetal Non-stress Test: reactive  Cervical: deferred ;  Membranes: ruptured: .yes  Abdomen: gravid, soft, NT  Ext: SCDs on, NT    Meds:     Current Facility-Administered Medications:   •  insulin lispro (AdmeLOG,HumaLOG) injection, 6 Units, Subcutaneous, TID AC, Amara Arreaga M.D., 6 Units at 06/23/22 0956  •  docusate sodium (COLACE) capsule 100 mg, 100 mg, Oral, BID, Amara Arreaga M.D., 100 mg at 06/24/22 0905  •  lactated ringers infusion, , Intravenous, Continuous, Morteza Acosta M.D., Last Rate: 125 mL/hr at 06/24/22 0215, Rate Change at 06/24/22 0215  •  amoxicillin (AMOXIL) capsule 250 mg, 250 mg, Oral, Q8HRS, Morteza Acosta M.D., 250 mg at 06/24/22 0905  •  prenatal plus vitamin (STUARTNATAL 1+1) 27-1 MG tablet 1 Tablet, 1 Tablet, Oral, Daily-0800, Morteza Acosta M.D., 1 Tablet at 06/23/22 2007  •  doxylamine (UNISOM) tablet 25 mg, 25 mg, Oral, QHS, Morteza Acosta M.D., 25 mg at 06/23/22 2128  •  ondansetron (ZOFRAN) syringe/vial injection 4 mg, 4 mg, Intravenous, Q6HRS PRN, Morteza Acosta M.D., 4 mg at 06/22/22 1149  •  Icy Hot Balm Extra Strength 7.6-29 % topical ointment, , Topical, PRN, Amara Arreaga M.D.    Labs:    Lab:   Recent Results (from the past 72 hour(s))    SARS-CoV-2, PCR (In-House)    Collection Time: 06/22/22  5:20 AM   Result Value Ref Range    SARS-CoV-2 Source NP Swab     SARS-CoV-2 by PCR NotDetected    CBC WITH DIFFERENTIAL    Collection Time: 06/22/22  5:21 AM   Result Value Ref Range    WBC 15.5 (H) 4.8 - 10.8 K/uL    RBC 5.07 4.20 - 5.40 M/uL    Hemoglobin 13.7 12.0 - 16.0 g/dL    Hematocrit 41.4 37.0 - 47.0 %    MCV 81.7 81.4 - 97.8 fL    MCH 27.0 27.0 - 33.0 pg    MCHC 33.1 (L) 33.6 - 35.0 g/dL    RDW 40.1 35.9 - 50.0 fL    Platelet Count 194 164 - 446 K/uL    MPV 11.3 9.0 - 12.9 fL    Neutrophils-Polys 88.50 (H) 44.00 - 72.00 %    Lymphocytes 8.90 (L) 22.00 - 41.00 %    Monocytes 1.70 0.00 - 13.40 %    Eosinophils 0.00 0.00 - 6.90 %    Basophils 0.30 0.00 - 1.80 %    Immature Granulocytes 0.60 0.00 - 0.90 %    Nucleated RBC 0.00 /100 WBC    Neutrophils (Absolute) 13.69 (H) 2.00 - 7.15 K/uL    Lymphs (Absolute) 1.38 1.00 - 4.80 K/uL    Monos (Absolute) 0.27 0.00 - 0.85 K/uL    Eos (Absolute) 0.00 0.00 - 0.51 K/uL    Baso (Absolute) 0.04 0.00 - 0.12 K/uL    Immature Granulocytes (abs) 0.10 0.00 - 0.11 K/uL    NRBC (Absolute) 0.00 K/uL   T.PALLIDUM AB EIA    Collection Time: 06/22/22  5:21 AM   Result Value Ref Range    Syphilis, Treponemal Qual Non-Reactive Non-Reactive   MAGNESIUM    Collection Time: 06/22/22  5:21 AM   Result Value Ref Range    Magnesium 5.5 (H) 1.5 - 2.5 mg/dL   Comp Metabolic Panel    Collection Time: 06/22/22  5:21 AM   Result Value Ref Range    Sodium 136 135 - 145 mmol/L    Potassium 3.7 3.6 - 5.5 mmol/L    Chloride 102 96 - 112 mmol/L    Co2 19 (L) 20 - 33 mmol/L    Anion Gap 15.0 7.0 - 16.0    Glucose 127 (H) 65 - 99 mg/dL    Bun 9 8 - 22 mg/dL    Creatinine 0.51 0.50 - 1.40 mg/dL    Calcium 8.0 (L) 8.5 - 10.5 mg/dL    AST(SGOT) 16 12 - 45 U/L    ALT(SGPT) 11 2 - 50 U/L    Alkaline Phosphatase 152 (H) 30 - 99 U/L    Total Bilirubin <0.2 0.1 - 1.5 mg/dL    Albumin 3.6 3.2 - 4.9 g/dL    Total Protein 6.4 6.0 - 8.2 g/dL    Globulin  2.8 1.9 - 3.5 g/dL    A-G Ratio 1.3 g/dL   ESTIMATED GFR    Collection Time: 06/22/22  5:21 AM   Result Value Ref Range    GFR (CKD-EPI) 128 >60 mL/min/1.73 m 2   POCT glucose device results    Collection Time: 06/22/22  8:37 AM   Result Value Ref Range    POC Glucose, Blood 144 (H) 65 - 99 mg/dL   POCT glucose device results    Collection Time: 06/22/22 10:34 AM   Result Value Ref Range    POC Glucose, Blood 222 (H) 65 - 99 mg/dL   SERUM MAGNESIUM LEVELS    Collection Time: 06/22/22 11:08 AM   Result Value Ref Range    Magnesium 6.1 (H) 1.5 - 2.5 mg/dL   POCT glucose device results    Collection Time: 06/22/22 11:27 AM   Result Value Ref Range    POC Glucose, Blood 192 (H) 65 - 99 mg/dL   POCT glucose device results    Collection Time: 06/22/22  3:27 PM   Result Value Ref Range    POC Glucose, Blood 164 (H) 65 - 99 mg/dL   SERUM MAGNESIUM LEVELS    Collection Time: 06/22/22  5:39 PM   Result Value Ref Range    Magnesium 7.0 (H) 1.5 - 2.5 mg/dL   POCT glucose device results    Collection Time: 06/22/22  7:29 PM   Result Value Ref Range    POC Glucose, Blood 167 (H) 65 - 99 mg/dL   SERUM MAGNESIUM LEVELS    Collection Time: 06/22/22 11:09 PM   Result Value Ref Range    Magnesium 6.8 (H) 1.5 - 2.5 mg/dL   SERUM MAGNESIUM LEVELS    Collection Time: 06/23/22  5:10 AM   Result Value Ref Range    Magnesium 7.0 (H) 1.5 - 2.5 mg/dL   POCT glucose device results    Collection Time: 06/23/22  6:51 AM   Result Value Ref Range    POC Glucose, Blood 168 (H) 65 - 99 mg/dL   POCT glucose device results    Collection Time: 06/23/22  9:51 AM   Result Value Ref Range    POC Glucose, Blood 211 (H) 65 - 99 mg/dL   SERUM MAGNESIUM LEVELS    Collection Time: 06/23/22 11:37 AM   Result Value Ref Range    Magnesium 6.9 (H) 1.5 - 2.5 mg/dL   POCT glucose device results    Collection Time: 06/23/22  3:29 PM   Result Value Ref Range    POC Glucose, Blood 121 (H) 65 - 99 mg/dL   SERUM MAGNESIUM LEVELS    Collection Time: 06/23/22  5:10 PM    Result Value Ref Range    Magnesium 7.0 (H) 1.5 - 2.5 mg/dL   SERUM MAGNESIUM LEVELS    Collection Time: 22 10:48 PM   Result Value Ref Range    Magnesium 5.7 (H) 1.5 - 2.5 mg/dL   POCT glucose device results    Collection Time: 22  5:52 AM   Result Value Ref Range    POC Glucose, Blood 110 (H) 65 - 99 mg/dL       Assessment:  32 y.o.  @ 32w1d  PPROM  Possible IUGR  A1GDM    Plan:  MgSO4 off  BMZ x 2 given  Discussed need to be on bedrest secondary to PPROM and patient and spouse seems unaware of prematurity of baby  Complains of feeling baby move and tightening and explained PTL precautions  Spent 35 mins face to face answering questions

## 2022-06-24 NOTE — PROGRESS NOTES
1900: Received report from Brea LACY, plan of care discussed. RN at bedside, call light in reach, pt encouraged to use when in need of assistance.     2137: Dr. Arreaga was called to discuss plan of care, plan to DC Mag at 0215, orders to continue IV fluids at 125mL/hr, after Mag is turned off. If pt starts to contract again after Mag is turned off to call MD for new plan of care.     0215: RN at bedside to DC Mag. Plan to monitor Pt for an hour and then take roldan cath out.     0330: RN at bedside to remove roldan cath. Pt educated to call out before getting up to the bathroom for the first time, pt verbalizing understanding.     0700: Report given to  Brea LACY, plan of care discussed.

## 2022-06-25 ENCOUNTER — ANESTHESIA (OUTPATIENT)
Dept: ANESTHESIOLOGY | Facility: MEDICAL CENTER | Age: 32
End: 2022-06-25
Payer: COMMERCIAL

## 2022-06-25 ENCOUNTER — ANESTHESIA EVENT (OUTPATIENT)
Dept: ANESTHESIOLOGY | Facility: MEDICAL CENTER | Age: 32
End: 2022-06-25
Payer: COMMERCIAL

## 2022-06-25 LAB
BASOPHILS # BLD AUTO: 0.3 % (ref 0–1.8)
BASOPHILS # BLD: 0.05 K/UL (ref 0–0.12)
EOSINOPHIL # BLD AUTO: 0.02 K/UL (ref 0–0.51)
EOSINOPHIL NFR BLD: 0.1 % (ref 0–6.9)
ERYTHROCYTE [DISTWIDTH] IN BLOOD BY AUTOMATED COUNT: 42.3 FL (ref 35.9–50)
HCT VFR BLD AUTO: 43.1 % (ref 37–47)
HGB BLD-MCNC: 13.9 G/DL (ref 12–16)
HOLDING TUBE BB 8507: NORMAL
IMM GRANULOCYTES # BLD AUTO: 0.15 K/UL (ref 0–0.11)
IMM GRANULOCYTES NFR BLD AUTO: 0.9 % (ref 0–0.9)
LYMPHOCYTES # BLD AUTO: 2.91 K/UL (ref 1–4.8)
LYMPHOCYTES NFR BLD: 16.8 % (ref 22–41)
MCH RBC QN AUTO: 26.9 PG (ref 27–33)
MCHC RBC AUTO-ENTMCNC: 32.3 G/DL (ref 33.6–35)
MCV RBC AUTO: 83.5 FL (ref 81.4–97.8)
MONOCYTES # BLD AUTO: 1.1 K/UL (ref 0–0.85)
MONOCYTES NFR BLD AUTO: 6.4 % (ref 0–13.4)
NEUTROPHILS # BLD AUTO: 13.05 K/UL (ref 2–7.15)
NEUTROPHILS NFR BLD: 75.5 % (ref 44–72)
NRBC # BLD AUTO: 0 K/UL
NRBC BLD-RTO: 0 /100 WBC
PLATELET # BLD AUTO: 199 K/UL (ref 164–446)
PMV BLD AUTO: 11.5 FL (ref 9–12.9)
RBC # BLD AUTO: 5.16 M/UL (ref 4.2–5.4)
WBC # BLD AUTO: 17.3 K/UL (ref 4.8–10.8)

## 2022-06-25 PROCEDURE — 01967 NEURAXL LBR ANES VAG DLVR: CPT | Performed by: ANESTHESIOLOGY

## 2022-06-25 PROCEDURE — 700101 HCHG RX REV CODE 250: Performed by: ANESTHESIOLOGY

## 2022-06-25 PROCEDURE — A9270 NON-COVERED ITEM OR SERVICE: HCPCS | Performed by: OBSTETRICS & GYNECOLOGY

## 2022-06-25 PROCEDURE — 770002 HCHG ROOM/CARE - OB PRIVATE (112)

## 2022-06-25 PROCEDURE — 700111 HCHG RX REV CODE 636 W/ 250 OVERRIDE (IP)

## 2022-06-25 PROCEDURE — 700111 HCHG RX REV CODE 636 W/ 250 OVERRIDE (IP): Performed by: ANESTHESIOLOGY

## 2022-06-25 PROCEDURE — 700111 HCHG RX REV CODE 636 W/ 250 OVERRIDE (IP): Performed by: OBSTETRICS & GYNECOLOGY

## 2022-06-25 PROCEDURE — 85025 COMPLETE CBC W/AUTO DIFF WBC: CPT

## 2022-06-25 PROCEDURE — 303615 HCHG EPIDURAL/SPINAL ANESTHESIA FOR LABOR

## 2022-06-25 PROCEDURE — 304965 HCHG RECOVERY SERVICES

## 2022-06-25 PROCEDURE — 700102 HCHG RX REV CODE 250 W/ 637 OVERRIDE(OP): Performed by: OBSTETRICS & GYNECOLOGY

## 2022-06-25 PROCEDURE — 59409 OBSTETRICAL CARE: CPT

## 2022-06-25 PROCEDURE — 88307 TISSUE EXAM BY PATHOLOGIST: CPT

## 2022-06-25 PROCEDURE — 36415 COLL VENOUS BLD VENIPUNCTURE: CPT

## 2022-06-25 PROCEDURE — 0KQM0ZZ REPAIR PERINEUM MUSCLE, OPEN APPROACH: ICD-10-PCS | Performed by: OBSTETRICS & GYNECOLOGY

## 2022-06-25 RX ORDER — IBUPROFEN 800 MG/1
800 TABLET ORAL EVERY 8 HOURS PRN
Status: DISCONTINUED | OUTPATIENT
Start: 2022-06-25 | End: 2022-06-27 | Stop reason: HOSPADM

## 2022-06-25 RX ORDER — SODIUM CHLORIDE, SODIUM LACTATE, POTASSIUM CHLORIDE, CALCIUM CHLORIDE 600; 310; 30; 20 MG/100ML; MG/100ML; MG/100ML; MG/100ML
INJECTION, SOLUTION INTRAVENOUS CONTINUOUS
Status: DISCONTINUED | OUTPATIENT
Start: 2022-06-25 | End: 2022-06-25

## 2022-06-25 RX ORDER — ROPIVACAINE HYDROCHLORIDE 2 MG/ML
INJECTION, SOLUTION EPIDURAL; INFILTRATION; PERINEURAL
Status: COMPLETED
Start: 2022-06-25 | End: 2022-06-25

## 2022-06-25 RX ORDER — IBUPROFEN 800 MG/1
800 TABLET ORAL
Status: COMPLETED | OUTPATIENT
Start: 2022-06-25 | End: 2022-06-25

## 2022-06-25 RX ORDER — TERBUTALINE SULFATE 1 MG/ML
0.25 INJECTION, SOLUTION SUBCUTANEOUS
Status: DISCONTINUED | OUTPATIENT
Start: 2022-06-25 | End: 2022-06-25 | Stop reason: HOSPADM

## 2022-06-25 RX ORDER — MISOPROSTOL 200 UG/1
600 TABLET ORAL
Status: DISCONTINUED | OUTPATIENT
Start: 2022-06-25 | End: 2022-06-27 | Stop reason: HOSPADM

## 2022-06-25 RX ORDER — DOCUSATE SODIUM 100 MG/1
100 CAPSULE, LIQUID FILLED ORAL 2 TIMES DAILY PRN
Status: DISCONTINUED | OUTPATIENT
Start: 2022-06-25 | End: 2022-06-27 | Stop reason: HOSPADM

## 2022-06-25 RX ORDER — METHYLERGONOVINE MALEATE 0.2 MG/ML
0.2 INJECTION INTRAVENOUS
Status: DISCONTINUED | OUTPATIENT
Start: 2022-06-25 | End: 2022-06-27 | Stop reason: HOSPADM

## 2022-06-25 RX ORDER — METHYLERGONOVINE MALEATE 0.2 MG/ML
INJECTION INTRAVENOUS
Status: COMPLETED
Start: 2022-06-25 | End: 2022-06-25

## 2022-06-25 RX ORDER — OXYTOCIN 10 [USP'U]/ML
10 INJECTION, SOLUTION INTRAMUSCULAR; INTRAVENOUS
Status: DISCONTINUED | OUTPATIENT
Start: 2022-06-25 | End: 2022-06-25 | Stop reason: HOSPADM

## 2022-06-25 RX ORDER — BUPIVACAINE HYDROCHLORIDE 2.5 MG/ML
INJECTION, SOLUTION EPIDURAL; INFILTRATION; INTRACAUDAL
Status: COMPLETED | OUTPATIENT
Start: 2022-06-25 | End: 2022-06-25

## 2022-06-25 RX ORDER — OXYCODONE HYDROCHLORIDE 5 MG/1
5 TABLET ORAL EVERY 4 HOURS PRN
Status: DISCONTINUED | OUTPATIENT
Start: 2022-06-25 | End: 2022-06-27 | Stop reason: HOSPADM

## 2022-06-25 RX ORDER — BISACODYL 10 MG
10 SUPPOSITORY, RECTAL RECTAL PRN
Status: COMPLETED | OUTPATIENT
Start: 2022-06-25 | End: 2022-06-25

## 2022-06-25 RX ORDER — ACETAMINOPHEN 500 MG
1000 TABLET ORAL EVERY 6 HOURS PRN
Status: DISCONTINUED | OUTPATIENT
Start: 2022-06-25 | End: 2022-06-27 | Stop reason: HOSPADM

## 2022-06-25 RX ORDER — LIDOCAINE HYDROCHLORIDE AND EPINEPHRINE 15; 5 MG/ML; UG/ML
INJECTION, SOLUTION EPIDURAL
Status: COMPLETED | OUTPATIENT
Start: 2022-06-25 | End: 2022-06-25

## 2022-06-25 RX ORDER — SODIUM CHLORIDE, SODIUM LACTATE, POTASSIUM CHLORIDE, CALCIUM CHLORIDE 600; 310; 30; 20 MG/100ML; MG/100ML; MG/100ML; MG/100ML
INJECTION, SOLUTION INTRAVENOUS PRN
Status: DISCONTINUED | OUTPATIENT
Start: 2022-06-25 | End: 2022-06-27 | Stop reason: HOSPADM

## 2022-06-25 RX ORDER — ACETAMINOPHEN 500 MG
1000 TABLET ORAL
Status: DISCONTINUED | OUTPATIENT
Start: 2022-06-25 | End: 2022-06-25 | Stop reason: HOSPADM

## 2022-06-25 RX ADMIN — PRENATAL WITH FERROUS FUM AND FOLIC ACID 1 TABLET: 3080; 920; 120; 400; 22; 1.84; 3; 20; 10; 1; 12; 200; 27; 25; 2 TABLET ORAL at 19:55

## 2022-06-25 RX ADMIN — IBUPROFEN 800 MG: 800 TABLET, FILM COATED ORAL at 07:53

## 2022-06-25 RX ADMIN — AMOXICILLIN 250 MG: 250 CAPSULE ORAL at 00:51

## 2022-06-25 RX ADMIN — OXYTOCIN 2000 ML/HR: 10 INJECTION, SOLUTION INTRAMUSCULAR; INTRAVENOUS at 06:30

## 2022-06-25 RX ADMIN — IBUPROFEN 800 MG: 800 TABLET, FILM COATED ORAL at 15:30

## 2022-06-25 RX ADMIN — METHYLERGONOVINE MALEATE 0.2 MG: 0.2 INJECTION, SOLUTION INTRAMUSCULAR; INTRAVENOUS at 06:46

## 2022-06-25 RX ADMIN — ACETAMINOPHEN 1000 MG: 500 TABLET, FILM COATED ORAL at 12:03

## 2022-06-25 RX ADMIN — OXYCODONE 5 MG: 5 TABLET ORAL at 13:09

## 2022-06-25 RX ADMIN — OXYCODONE 5 MG: 5 TABLET ORAL at 17:42

## 2022-06-25 RX ADMIN — OXYCODONE 5 MG: 5 TABLET ORAL at 21:54

## 2022-06-25 RX ADMIN — FENTANYL CITRATE 100 MCG: 50 INJECTION, SOLUTION INTRAMUSCULAR; INTRAVENOUS at 04:58

## 2022-06-25 RX ADMIN — BISACODYL 10 MG: 10 SUPPOSITORY RECTAL at 18:15

## 2022-06-25 RX ADMIN — AMOXICILLIN 250 MG: 250 CAPSULE ORAL at 14:29

## 2022-06-25 RX ADMIN — OXYTOCIN 125 ML/HR: 10 INJECTION, SOLUTION INTRAMUSCULAR; INTRAVENOUS at 07:03

## 2022-06-25 RX ADMIN — ROPIVACAINE HYDROCHLORIDE 200 MG: 2 INJECTION, SOLUTION EPIDURAL; INFILTRATION at 05:31

## 2022-06-25 RX ADMIN — BUPIVACAINE HYDROCHLORIDE 8 ML: 2.5 INJECTION, SOLUTION EPIDURAL; INFILTRATION; INTRACAUDAL; PERINEURAL at 05:15

## 2022-06-25 RX ADMIN — AMOXICILLIN 250 MG: 250 CAPSULE ORAL at 22:11

## 2022-06-25 RX ADMIN — LIDOCAINE HYDROCHLORIDE,EPINEPHRINE BITARTRATE 5 ML: 15; .005 INJECTION, SOLUTION EPIDURAL; INFILTRATION; INTRACAUDAL; PERINEURAL at 05:15

## 2022-06-25 RX ADMIN — DOCUSATE SODIUM 100 MG: 100 CAPSULE, LIQUID FILLED ORAL at 17:42

## 2022-06-25 RX ADMIN — ACETAMINOPHEN 1000 MG: 500 TABLET, FILM COATED ORAL at 19:54

## 2022-06-25 RX ADMIN — IBUPROFEN 800 MG: 800 TABLET, FILM COATED ORAL at 23:20

## 2022-06-25 ASSESSMENT — PAIN DESCRIPTION - PAIN TYPE
TYPE: ACUTE PAIN

## 2022-06-25 ASSESSMENT — PAIN SCALES - GENERAL: PAIN_LEVEL: 2

## 2022-06-25 NOTE — PROGRESS NOTES
Instructed patient on the use of the electric breast pump. Settings: speed 80 x 2 minutes, then 60 x 13 minutes; suction 20-30%. Pump for a total of 15 minutes every 3 hours. Instructed patient on cleaning pump parts and provided soap, and sponges. Patient stickers provided and instructed patient to write date and time of each pumping on milk stickers.

## 2022-06-25 NOTE — ANESTHESIA PROCEDURE NOTES
Epidural Block    Date/Time: 6/25/2022 5:15 AM  Performed by: Vernon Bryan M.D.  Authorized by: Vernon Bryan M.D.     Patient Location:  OB  Start Time:  6/25/2022 5:15 AM  Reason for Block: labor analgesia    patient identified, IV checked, site marked, risks and benefits discussed, surgical consent, monitors and equipment checked, pre-op evaluation and timeout performed    Patient Position:  Sitting  Prep: ChloraPrep, patient draped and sterile technique    Monitoring:  Blood pressure, continuous pulse oximetry and heart rate  Approach:  Midline  Location:  L3-L4  Injection Technique:  SANTOS saline  Skin infiltration:  Lidocaine  Strength:  1%  Dose:  3ml  Needle Type:  Tuohy  Needle Gauge:  17 G  Needle Length:  3.5 in  Loss of resistance::  5.5  Catheter Size:  19 G  Catheter at Skin Depth:  11  Test Dose Result:  Negative

## 2022-06-25 NOTE — ANESTHESIA PREPROCEDURE EVALUATION
Date: 06/25/22  Procedure: Labor Epidural       32 wks, active labor.    Relevant Problems   No relevant active problems       Physical Exam    Airway   Mallampati: II  TM distance: >3 FB  Neck ROM: full       Cardiovascular - normal exam  Rhythm: regular  Rate: normal  (-) murmur     Dental - normal exam           Pulmonary - normal exam  Breath sounds clear to auscultation     Abdominal    Neurological - normal exam                 Anesthesia Plan    ASA 2       Plan - epidural   Neuraxial block will be labor analgesia                  Pertinent diagnostic labs and testing reviewed    Informed Consent:    Anesthetic plan and risks discussed with patient.

## 2022-06-25 NOTE — ANESTHESIA POSTPROCEDURE EVALUATION
Patient: Dolores Marques    Procedure Summary     Date: 06/25/22 Room / Location:     Anesthesia Start: 0510 Anesthesia Stop: 0624    Procedure: Labor Epidural Diagnosis:     Scheduled Providers:  Responsible Provider: Vernon Bryan M.D.    Anesthesia Type: epidural ASA Status: 2          Final Anesthesia Type: epidural  Last vitals  BP   Blood Pressure: 116/68    Temp   36.3 °C (97.3 °F)    Pulse   84   Resp   18    SpO2   98 %      Anesthesia Post Evaluation    Patient location during evaluation: bedside  Patient participation: complete - patient participated  Level of consciousness: awake and alert  Pain score: 2    Airway patency: patent  Anesthetic complications: no  Cardiovascular status: hemodynamically stable  Respiratory status: acceptable  Hydration status: euvolemic    PONV: none          There were no known complications for this encounter.

## 2022-06-25 NOTE — PROGRESS NOTES
Received report from BAMBI Caraballo at 8446. Pt arrived to S323 at 1001. Assumed care of pt. Assessment complete.

## 2022-06-25 NOTE — PROGRESS NOTES
0500_ Assumed pt care. Report from BAMBI Rhodes.    0503_ Pt transferred to OR 1    0515_ Dr Bryan @ bedside for epidural. Time out called.    0530_ Dr Arreaga @ bedside. SVE 8cm    0624_  viable male apgars 7/9. NICU @ delivery    0626_ Placenta delivered S/I    0655_ Pt transferred to room 213    0700_ Report to BAMBI Savage

## 2022-06-25 NOTE — ANESTHESIA TIME REPORT
Anesthesia Start and Stop Event Times     Date Time Event    6/25/2022 0510 Ready for Procedure     0510 Anesthesia Start     0624 Anesthesia Stop        Responsible Staff  06/25/22    Name Role Begin End    Vernon Bryan M.D. Anesth 0510 0624        Overtime Reason:  no overtime (within assigned shift)    Comments:

## 2022-06-25 NOTE — L&D DELIVERY NOTE
DATE OF SERVICE:  2022     The patient is a 32-year-old -0-0-1 at 32-2/7th weeks who was initially   admitted on 2022 for spontaneous rupture of membranes.  The patient at   that time was noted to have rupture of membranes and she also possibly had   IUGR and is A1 gestational diabetic.  The patient received two doses of   betamethasone and also was on magnesium for 48 hours secondary to receiving   the steroids.  She was also getting antibiotics; however, earlier this   morning, the patient reported that she was starting to have vaginal bleeding   and pressure and at that time, she was examined and noted to have bright red   bleeding and was noted to be 6-7 cm at that time.  The patient was then given   epidural for pain management and delivery was imminent.  The patient delivered   a viable male infant, Apgars of 7 and 9 over a second-degree laceration.    There was no nuchal cord encountered.  Bulb suction was performed on the   perineum.  The remainder of infant delivered without complications.  NICU was   present at delivery.  After a 30-second delay, the cord was doubly clamped and   cut and the infant was given to NICU for assessment.  The placenta delivered   very quickly after delivery of infant and was noted to have a possible mild placental   abruption secondary to increased vaginal bleeding noted with delivery of the   infant and also some port-wine blood staining of the amniotic fluid.  The placenta   delivered intact spontaneously. Three-vessel cord. Placenta was sent to pathology   as well as cord gases.   cc. The laceration was approximated with 3-0   Vicryl. Anesthesia is epidural. Currently, mother is doing well and baby has been taken   to the intensive care nursery.  Sex of the infant is male. Weight 3lbs 10.2 oz.        ______________________________  MD NEYDA Willis/NAWAF/HARVINDER    DD:  2022 07:02  DT:  2022 07:58    Job#:  [id number]

## 2022-06-25 NOTE — PROGRESS NOTES
Spoke with MD Arreaga about plan of care for patient. MD agreed to do BID monitoring given no contractions, and giovanna ambrosio patient is orally hydrating. Will continue to monitor patient and symptoms.

## 2022-06-25 NOTE — PROGRESS NOTES
1900 Report received from Brea LACY. Plan of care discussed.    2227 Dr Arreaga notified of pt 1hr PP result. MD orders 6 units Lispro now and to give 6 units whenever BS result above 160. Pt can be on continuous monitoring if ctxns.    EFM applied for continuous monitoring.     0415 Pt call out. RN at bedside to assess. Pt states she saw blood on her pad, more than what she noticed during the day. Small amount of bloody show noted on pad. Perineal assessment with increased bloody show. Pt on EFM with fluid infusing.     0420 SVE 6/70-8-/-1     0445 DR Arreaga notified of SVE.  Charge nurse and NICU staff informed of pt status change. Pt transferred to labor, room 213.     0455 Pt complaining of worsening pain and asking for epidural. Fentanyl dose given for pain control. SVE 7/80/-1    0459 Dr Arreaga notified with pt labor status. Provider is coming in for delivery.    0500 Report to Rose Mary LACY. POC discussed. Pt transferred to OR 1 for delivery.

## 2022-06-25 NOTE — PROGRESS NOTES
0950: pt ambulated to restroom. Tolerated well. Pt voided. pericare performed. Pt. Tolerated well.     1000: pt transferred to postpartum. Report given to BAMBI hong.

## 2022-06-25 NOTE — PROGRESS NOTES
0700 - Report from Rose Mary LACY. Assumed care of pt, chart reviewed, POC discussed. Fundus is firm 2 below umbilicus, with no lochia noted.     0924 - Report to Kaelyn LACY. Report called to postpartum BAMBI Lazcano.

## 2022-06-26 LAB
ERYTHROCYTE [DISTWIDTH] IN BLOOD BY AUTOMATED COUNT: 41.1 FL (ref 35.9–50)
HCT VFR BLD AUTO: 34.8 % (ref 37–47)
HGB BLD-MCNC: 11.3 G/DL (ref 12–16)
MCH RBC QN AUTO: 26.8 PG (ref 27–33)
MCHC RBC AUTO-ENTMCNC: 32.5 G/DL (ref 33.6–35)
MCV RBC AUTO: 82.7 FL (ref 81.4–97.8)
PLATELET # BLD AUTO: 185 K/UL (ref 164–446)
PMV BLD AUTO: 11.1 FL (ref 9–12.9)
RBC # BLD AUTO: 4.21 M/UL (ref 4.2–5.4)
WBC # BLD AUTO: 12.3 K/UL (ref 4.8–10.8)

## 2022-06-26 PROCEDURE — 36415 COLL VENOUS BLD VENIPUNCTURE: CPT

## 2022-06-26 PROCEDURE — 85027 COMPLETE CBC AUTOMATED: CPT

## 2022-06-26 PROCEDURE — 770002 HCHG ROOM/CARE - OB PRIVATE (112)

## 2022-06-26 PROCEDURE — A9270 NON-COVERED ITEM OR SERVICE: HCPCS | Performed by: OBSTETRICS & GYNECOLOGY

## 2022-06-26 PROCEDURE — 700102 HCHG RX REV CODE 250 W/ 637 OVERRIDE(OP): Performed by: OBSTETRICS & GYNECOLOGY

## 2022-06-26 RX ADMIN — DOCUSATE SODIUM 100 MG: 100 CAPSULE, LIQUID FILLED ORAL at 07:19

## 2022-06-26 RX ADMIN — AMOXICILLIN 250 MG: 250 CAPSULE ORAL at 21:59

## 2022-06-26 RX ADMIN — OXYCODONE 5 MG: 5 TABLET ORAL at 10:41

## 2022-06-26 RX ADMIN — OXYCODONE 5 MG: 5 TABLET ORAL at 02:15

## 2022-06-26 RX ADMIN — AMOXICILLIN 250 MG: 250 CAPSULE ORAL at 15:47

## 2022-06-26 RX ADMIN — IBUPROFEN 800 MG: 800 TABLET, FILM COATED ORAL at 07:19

## 2022-06-26 RX ADMIN — PRENATAL WITH FERROUS FUM AND FOLIC ACID 1 TABLET: 3080; 920; 120; 400; 22; 1.84; 3; 20; 10; 1; 12; 200; 27; 25; 2 TABLET ORAL at 22:02

## 2022-06-26 RX ADMIN — ACETAMINOPHEN 1000 MG: 500 TABLET, FILM COATED ORAL at 06:24

## 2022-06-26 RX ADMIN — ACETAMINOPHEN 1000 MG: 500 TABLET, FILM COATED ORAL at 18:26

## 2022-06-26 RX ADMIN — DOXYLAMINE SUCCINATE 25 MG: 25 TABLET ORAL at 21:59

## 2022-06-26 RX ADMIN — AMOXICILLIN 250 MG: 250 CAPSULE ORAL at 06:24

## 2022-06-26 RX ADMIN — IBUPROFEN 800 MG: 800 TABLET, FILM COATED ORAL at 21:59

## 2022-06-26 RX ADMIN — ACETAMINOPHEN 1000 MG: 500 TABLET, FILM COATED ORAL at 12:31

## 2022-06-26 ASSESSMENT — EDINBURGH POSTNATAL DEPRESSION SCALE (EPDS)
I HAVE BEEN ANXIOUS OR WORRIED FOR NO GOOD REASON: NO, NOT AT ALL
THINGS HAVE BEEN GETTING ON TOP OF ME: NO, I HAVE BEEN COPING AS WELL AS EVER
I HAVE FELT SAD OR MISERABLE: NO, NOT AT ALL
I HAVE BEEN ABLE TO LAUGH AND SEE THE FUNNY SIDE OF THINGS: AS MUCH AS I ALWAYS COULD
THE THOUGHT OF HARMING MYSELF HAS OCCURRED TO ME: NEVER
I HAVE FELT SCARED OR PANICKY FOR NO GOOD REASON: NO, NOT AT ALL
I HAVE BEEN SO UNHAPPY THAT I HAVE HAD DIFFICULTY SLEEPING: NOT AT ALL
I HAVE BLAMED MYSELF UNNECESSARILY WHEN THINGS WENT WRONG: NO, NEVER
I HAVE LOOKED FORWARD WITH ENJOYMENT TO THINGS: AS MUCH AS I EVER DID
I HAVE BEEN SO UNHAPPY THAT I HAVE BEEN CRYING: NO, NEVER

## 2022-06-26 ASSESSMENT — PAIN DESCRIPTION - PAIN TYPE
TYPE: ACUTE PAIN

## 2022-06-26 ASSESSMENT — PATIENT HEALTH QUESTIONNAIRE - PHQ9
SUM OF ALL RESPONSES TO PHQ9 QUESTIONS 1 AND 2: 0
2. FEELING DOWN, DEPRESSED, IRRITABLE, OR HOPELESS: NOT AT ALL
1. LITTLE INTEREST OR PLEASURE IN DOING THINGS: NOT AT ALL

## 2022-06-26 NOTE — LACTATION NOTE
"This note was copied from a baby's chart.  Baby 32.2 weeks LPI in NICU, , MOB Hx GDM- diet controlled, Mother likely to be discharged today. MOB reports she breast fed 1st baby x 1.5 months. Pump settings reviewed speed 80/60, suction 20% to comfort x 15 minutes then hand express 2-3 minutes, flange 22.5. MOB getting drop on flange, reassured mother drops are normal at this time. Pump schedule given with review, pump 8-10 times in 24 hours or every 3 hours.     MOB watched [employer] \"Maximizing Milk\",  provided demo on hand expressing.    MOB has [employer] insurance, recommended renting HG pump through [facility] [employer] today () for home.     Information sheets given with review:  1. Pump Schedule  2. Your LPI  3. HG pump rental  4. Storage Guidelines  5. NNB Resource sheet    "

## 2022-06-26 NOTE — DISCHARGE PLANNING
"Discharge Planning Assessment Post Partum    Reason for Referral: NICU admission  Address: [address] [place] [address] [place] [address]  Type of Living Situation:Lives with FOB and son  Mom Diagnosis: 32-2/7th weeks who was initially   admitted on 06/22/2022 for spontaneous rupture of membranes  Baby Diagnosis: Birth. The patient delivered  a viable male infant, Apgars of 7 and 9.  Primary Language: English, per MOB    Name of Baby: Will make a decision on name today, per MOB  Father of the Baby: Kodak Pichardo  Involved in baby’s care? Yes, at bedside  Contact Information: [phone].    [facility]renatal Care: Yes at [facility]  Mom's PCP: Does not have one educated on how to obtain one.   PCP for new baby: Yes.     Support System: MOB reports her parents came to visit for 6 months.   Coping/Bonding between mother & baby: Yes, per MOB  Source of Feeding: Breast milk and formula.   Supplies for Infant: Car seat, clothing, diapers, etc. Crib for safe sleeping. Education provided on safe sleeping.     Mom's Insurance: [employer]  Baby Covered on Insurance:Will add baby  Mother Employed/School: Working full time at [employer].   Other children in the home/names & ages: Yes, 5 year old Blanquita Pichardo    Financial Hardship/Income: No, per MOB. MOB asked about financial assistance with medication bill and was advised after discharge she can discuss payment plans   Mom's Mental status: Alert and oriented. Mood\"better than yesterday.\" Affect: pleasant and engaging.   Services used prior to admit: None    CPS History: None, per MOB  Psychiatric History: None, per MOB  Domestic Violence History: None, Per MOB  Drug/ETOH History: None, Per MOB    Resources Provided: None. Education on post partum signs and symptoms and where to seek help. Verbal education on safe sleep and how to obtain a PCP.   Referrals Made: To RN regarding concerns of their care in labor and delivery.      Clearance for Discharge: MOB and baby " are cleared by .      Ongoing Plan: Baby will remain in NICU.

## 2022-06-26 NOTE — PROGRESS NOTES
"Dolores Marques PP day 1    Subjective: Abdominal pain. no, ambulating .minimal, tolerating liquids .yes, tolerating regular diet .yes, flatus.yes, BM .no, Bleeding .light, voiding .yes,dizziness .no, breast feeding.yes, breast tenderness .no    /66   Pulse 71   Temp 36.9 °C (98.4 °F) (Temporal)   Resp 17   Ht 1.651 m (5' 5\")   Wt 66.7 kg (147 lb)   SpO2 98%   Breast Exam: Tenderness .no, Engourgement .no, Mastitis .no  Abdomen soft, non-tender. BS normal. No masses,  No organomegaly  Incision: none  Fundus Tenderness:no, Below umbilicus:Yes, firm  Perineumperineum intact  ExtremitiesNormal    Meds:   No current facility-administered medications on file prior to encounter.     No current outpatient medications on file prior to encounter.       Lab:   Recent Results (from the past 48 hour(s))   POCT glucose device results    Collection Time: 06/24/22  7:52 PM   Result Value Ref Range    POC Glucose, Blood 173 (H) 65 - 99 mg/dL   Hold Blood Bank Specimen (Not Tested)    Collection Time: 06/25/22  7:45 AM   Result Value Ref Range    Holding Tube - Bb DONE    CBC with differential    Collection Time: 06/25/22  7:45 AM   Result Value Ref Range    WBC 17.3 (H) 4.8 - 10.8 K/uL    RBC 5.16 4.20 - 5.40 M/uL    Hemoglobin 13.9 12.0 - 16.0 g/dL    Hematocrit 43.1 37.0 - 47.0 %    MCV 83.5 81.4 - 97.8 fL    MCH 26.9 (L) 27.0 - 33.0 pg    MCHC 32.3 (L) 33.6 - 35.0 g/dL    RDW 42.3 35.9 - 50.0 fL    Platelet Count 199 164 - 446 K/uL    MPV 11.5 9.0 - 12.9 fL    Neutrophils-Polys 75.50 (H) 44.00 - 72.00 %    Lymphocytes 16.80 (L) 22.00 - 41.00 %    Monocytes 6.40 0.00 - 13.40 %    Eosinophils 0.10 0.00 - 6.90 %    Basophils 0.30 0.00 - 1.80 %    Immature Granulocytes 0.90 0.00 - 0.90 %    Nucleated RBC 0.00 /100 WBC    Neutrophils (Absolute) 13.05 (H) 2.00 - 7.15 K/uL    Lymphs (Absolute) 2.91 1.00 - 4.80 K/uL    Monos (Absolute) 1.10 (H) 0.00 - 0.85 K/uL    Eos (Absolute) 0.02 0.00 - 0.51 K/uL    Baso (Absolute) " 0.05 0.00 - 0.12 K/uL    Immature Granulocytes (abs) 0.15 (H) 0.00 - 0.11 K/uL    NRBC (Absolute) 0.00 K/uL   CBC without differential- Once in AM regardless of delivery time    Collection Time: 06/26/22  5:59 AM   Result Value Ref Range    WBC 12.3 (H) 4.8 - 10.8 K/uL    RBC 4.21 4.20 - 5.40 M/uL    Hemoglobin 11.3 (L) 12.0 - 16.0 g/dL    Hematocrit 34.8 (L) 37.0 - 47.0 %    MCV 82.7 81.4 - 97.8 fL    MCH 26.8 (L) 27.0 - 33.0 pg    MCHC 32.5 (L) 33.6 - 35.0 g/dL    RDW 41.1 35.9 - 50.0 fL    Platelet Count 185 164 - 446 K/uL    MPV 11.1 9.0 - 12.9 fL       Assessment and Plan  normal postpartum course  No heavy bleeding or foul vaginal discharge     Continue Routine postpartum care  Home tomorrow  Baby in NICU

## 2022-06-26 NOTE — CARE PLAN
Problem: Altered Physiologic Condition  Goal: Patient physiologically stable as evidenced by normal lochia, palpable uterine involution and vitals within normal limits  Outcome: Progressing     Problem: Infection - Postpartum  Goal: Postpartum patient will be free of signs and symptoms of infection  Outcome: Progressing     The patient is Stable - Low risk of patient condition declining or worsening    Shift Goals  Clinical Goals: Pain controlled; rest  Patient Goals: pain control; rest  Family Goals: support    Progress made toward(s) clinical / shift goals:  Patient progressing towards goals remaining free from s/s of infection.    Patient is not progressing towards the following goals: N/A

## 2022-06-26 NOTE — PROGRESS NOTES
0700-- Received report from BAMBI Parks, Infant in NICU.  Pt resting in bed. Discussed pain management for the day.  No further needs at the time.  Call light within reach, bed locked and in lowest position.  Rounding in place.    0720-- Assessment completed, VSS, Pt given PRN pain medication at this time.  Discussed plan of care for the day that pt is comfortable with.  All questions answered at this time.  Will continue to monitor.

## 2022-06-26 NOTE — PROGRESS NOTES
1930:  Received report from day shift nurse.  Pt standing complaining of perineal/vaginal pain.  Pt. Given pain medication (see MAR).  Assessment performed.  Light lochia, fundus firm.  Pt. Expressed concern about going home with adequate pain control. All questions answered.  Bed in low position, call light within reach.  Provided ice pads.

## 2022-06-27 VITALS
SYSTOLIC BLOOD PRESSURE: 106 MMHG | BODY MASS INDEX: 24.49 KG/M2 | TEMPERATURE: 97.7 F | WEIGHT: 147 LBS | DIASTOLIC BLOOD PRESSURE: 70 MMHG | OXYGEN SATURATION: 98 % | RESPIRATION RATE: 16 BRPM | HEIGHT: 65 IN | HEART RATE: 86 BPM

## 2022-06-27 PROBLEM — Z34.90 INTRAUTERINE PREGNANCY: Status: RESOLVED | Noted: 2022-06-22 | Resolved: 2022-06-27

## 2022-06-27 LAB — PATHOLOGY CONSULT NOTE: NORMAL

## 2022-06-27 PROCEDURE — 700102 HCHG RX REV CODE 250 W/ 637 OVERRIDE(OP): Performed by: OBSTETRICS & GYNECOLOGY

## 2022-06-27 PROCEDURE — A9270 NON-COVERED ITEM OR SERVICE: HCPCS | Performed by: OBSTETRICS & GYNECOLOGY

## 2022-06-27 RX ORDER — IBUPROFEN 600 MG/1
600 TABLET ORAL EVERY 6 HOURS PRN
Qty: 30 TABLET | Refills: 1 | Status: SHIPPED | OUTPATIENT
Start: 2022-06-27

## 2022-06-27 RX ADMIN — IBUPROFEN 800 MG: 800 TABLET, FILM COATED ORAL at 06:01

## 2022-06-27 RX ADMIN — ACETAMINOPHEN 1000 MG: 500 TABLET, FILM COATED ORAL at 06:28

## 2022-06-27 RX ADMIN — AMOXICILLIN 250 MG: 250 CAPSULE ORAL at 16:34

## 2022-06-27 RX ADMIN — ACETAMINOPHEN 1000 MG: 500 TABLET, FILM COATED ORAL at 00:31

## 2022-06-27 RX ADMIN — AMOXICILLIN 250 MG: 250 CAPSULE ORAL at 06:01

## 2022-06-27 ASSESSMENT — PAIN DESCRIPTION - PAIN TYPE: TYPE: ACUTE PAIN

## 2022-06-27 NOTE — LACTATION NOTE
"This note was copied from a baby's chart.  Lactation follow-up visit:    MOB continues to pump every 3 hours for 32 week  in NICU. MOB to be discharged today, FOB rented HG pump for home. MOB discouraged because she is \"only getting drop on flange\", reassured mother to continue pumping every 3 hours, 8-10 times in 24 hours. LC reviewed \"supply & demand\", milk will come in, may take a few extra days due to early delivery. LC reviewed pumping schedule & using warm compresses & breast massage before pumping & hand expressing. MOB reports breasts do feel heavier today.   "

## 2022-06-27 NOTE — PROGRESS NOTES
Assumed care of patient, report at bedside from, Chantell LACY. Assessment completed and WDL. Call light within reach, discussed plan of care, denies pain at the moment. Will continue to monitor.

## 2022-06-27 NOTE — CARE PLAN
The patient is Stable - Low risk of patient condition declining or worsening    Shift Goals  Clinical Goals: Pt pain will be well controlled with prescribed medications.  Patient Goals: pain control; rest  Family Goals: support    Progress made toward(s) clinical / shift goals:  Pt pain has been well controlled with prescribed medications.  Pt knows to call for breakthrough pain.     Patient is not progressing towards the following goals: N/A

## 2022-06-27 NOTE — PROGRESS NOTES
Bedside report received from Mery ANN RN. Patient care assumed. Chart, prenatal labs, and orders reviewed  Patient assessment complete and WDL. Fundus firm and lochia light. Patient ambulating to bathroom and voiding without difficulty. Patient denies any dizziness/lightheadedness or calf pain/tenderness. Patient also denies any chest pain, difficulty breathing or SOB, respiratory symptoms, or any recent ill contacts. Plan of care discussed with patient for the day including pumping every 3 hours, pain management, and ambulation in halls. Pain medication plan discussed with patient; patient states she will call if PRN pain medication is wanted. All questions/concerns addressed at this time. Call light within reach, encouraged to call with needs. Will continue with routine postpartum cares.

## 2022-06-28 NOTE — DISCHARGE INSTRUCTIONS
PATIENT DISCHARGE EDUCATION INSTRUCTION SHEET  REASONS TO CALL YOUR OBSTETRICIAN  Persistent fever, shaking, chills (Temperature higher than 100.4) may indicate you have an infection  Heavy bleeding: soaking more than 1 pad per hour; Passing clots an egg-sized clot or bigger may mean you have an postpartum hemorrhage  Foul odor from vagina or bad smelling or discolored discharge or blood  Breast infection (Mastitis symptoms); breast pain, chills, fever, redness or red streaks, may feel flu like symptoms  Urinary pain, burning or frequency  Incision that is not healing, increased redness, swelling, tenderness or pain, or any pus from episiotomy or  site may mean you have an infection  Redness, swelling, warmth, or painful to touch in the calf area of your leg may mean you have a blood clot  Severe or intensified depression, thoughts or feelings of wanting to hurt yourself or someone else   Pain in chest, obstructed breathing or shortness of breath (trouble catching your breath) may mean you are having a postpartum complication. Call your provider immediately   Headache that does not get better, even after taking medicine, a bad headache with vision changes or pain in the upper right area of your belly may mean you have high blood pressure or post birth preeclampsia. Call your provider immediately    HAND WASHING  All family and friends should wash their hands:  Before and after holding the baby  Before feeding the baby  After using the restroom or changing the baby's diaper    Episiotomy/Laceration  May use rachel-spray bottle, witch hazel pads and dermaplast spray for comfort  Use rachel-spray bottle after urinating to cleanse perineal area  To prevent burning during urination spray rachel-water bottle on labial area   Pat perineal area dry until episiotomy/laceration is healed  Continue to use rachel-bottle until bleeding stops as needed  If have a 2nd degree laceration or greater, a Sitz bath can offer relief  from soreness, burning, and inflammation   Sitz Bath   Sit in 6 inches of warm water and soak laceration as needed until the laceration heals    VAGINAL CARE AND BLEEDING  Nothing inside vagina for 6 weeks:   No sexual intercourse, tampons or douching  Bleeding may continue for 2-4 weeks. Amount and color may vary  Soaking 1 pad or more in an hour for several hours is considered heavy bleeding  Passing large egg sized blood clots can be concerning  If you feel like you have heavy bleeding or are having increasing amount of blood clots call your Obstetrician immediately  If you begin feeling faint upon standing, feeling sick to your stomach, have clammy skin, a really fast heartbeat, have chills, start feeling confused, dizzy, sleepy or weak, or feeling like you're going to faint call your Obstetrician immediately    HYPERTENSION   Preeclampsia or gestational hypertension are types of high blood pressure that only pregnant women can get. It is important for you to be aware of symptoms to seek early intervention and treatment. If you have any of these symptoms immediately call your Obstetrician    Vision changes or blurred vision   Severe headache or pain that is unrelieved with medication and will not go away  Persistent pain in upper abdomen or shoulder   Increased swelling of face, feet, or hands  Difficulty breathing or shortness of breath at rest  Urinating less than usual    URINATION AND BOWEL MOVEMENTS  Eating more fiber (bran cereal, fruits, and vegetables) and drinking plenty of fluids will help to avoid constipation  Urinary frequency and urgency after childbirth is normal  If you experience any urinary pain, burning or frequency call your provider    BIRTH CONTROL  It is possible to become pregnant at any time after delivery and while breastfeeding  Plan to discuss a method of birth control with your physician at your post delivery follow up visit    POSTPARTUM BLUES  During the first few days after birth,  "you may experience a sense of the \"blues\" which may include impatience, irritability or even crying. These feelings come and go quickly. However, as many as 1 in 10 women experience emotional symptoms known as postpartum depression.     POSTPARTUM DEPRESSION    May start as early as the second or third day after delivery or take several weeks or months to develop. Symptoms of \"blues\" are present, but are more intense: Crying spells; loss of appetite; feelings of hopelessness or loss of control; fear of touching the baby; over concern or no concern at all about the baby; little or no concern about your own appearance/caring for yourself; and/or inability to sleep or excessive sleeping. Contact your Obstetrician if you are experiencing any of these symptoms     PREVENTING SHAKEN BABY  If you are angry or stressed, PUT THE BABY IN THE CRIB, step away, take some deep breaths, and wait until you are calm to care for the baby. DO NOT SHAKE THE BABY. You are not alone, call a supporter for help.  [facility]  crisis call line (933-737-5605) or (1-243.739.4856)  You can also text them, text \"ANSWER\" (864166)      Pregnancy and Childbirth: Premature Rupture of the Membranes (PROM)  During pregnancy, the baby is surrounded in the uterus by the amniotic sac. The sac is also called the “bag of villela.” It protects and cushions the baby. Premature rupture of the membranes (PROM) is when the amniotic sac breaks before you go into labor. Normally, the sac breaks after labor begins and contractions have started. If PROM happens at 37 weeks or earlier in pregnancy, it's called  PROM.      Are you at risk for PROM?  Healthcare providers aren’t sure what causes PROM. But certain things seem to make PROM more likely. These are called risk factors. Risk factors for PROM include:  Lack of prenatal care  Smoking during pregnancy  Low body weight  Bleeding from the vagina during the 2nd or 3rd trimester  Having had a " sexually transmitted infection (STI), or an infection in the bag of villela  Being pregnant with more than one baby  Having had certain medical procedures, such as:  Amniocentesis. This is a test that takes fluid from the amniotic sac.  Cerclage. This is sewing the cervix closed during pregnancy.    The dangers of PROM  PROM can cause these serious problems:  Germs can travel from the vagina into the uterus and cause a dangerous infection.  The umbilical cord can be squeezed, reducing blood flow to the baby.  The placenta can separate from the wall of the uterus (placental abruption). This can lead to severe bleeding.   The baby can be born too early. This can cause breathing and nervous system problems.  Symptoms of PROM  Call your healthcare provider right away if you have any of these symptoms:  Fluid leaking or gushing from the vagina. This is the main symptom. Even though there is fluid loss, it keeps leaking because the baby is making more. The fluid can be clear or light yellow.  bleeding from the vagina,  Pain in the lower belly (abdomen) or in the low back    Evaluating PROM  Your healthcare provider will ask about your symptoms. Tell them if you have recently had contractions, bleeding from the vagina, sex, or a fever. They will then likely do the following:  Examine your vagina and cervix.  Take a swab of fluid from the vagina. This is examined to see if there is any amniotic fluid.  Do an ultrasound test to measure amniotic fluid in the uterus.  Check your baby’s heart rate.  Treating PROM  PROM is treated based on where you are in your pregnancy:  If you are 34 weeks or earlier, you’ll likely be admitted to the hospital. There, you’ll be given antibiotics to prevent infection and to prolong the pregnancy. You may also be given medicine (steroids) to help the baby’s lungs mature. You and the baby will then be carefully watched for signs of infection. If there is enough amniotic fluid to test, it will be  analyzed to check how well the baby's lungs are developing. The baby’s lungs will also be checked for how they’re developing.  If you are between 34 and 37 weeks, labor will likely be induced.  If you are at 37 weeks or later, and you don't go into labor on your own, your provider will advise inducing labor.   Follow-up care  Work with your healthcare provider. Together, you can take steps to prevent PROM complications. This will ensure your health and your baby's health.      Pregnancy and Childbirth: Fetal Growth Restriction  With fetal growth restriction, an unborn baby is smaller than normal. This means the baby is not growing at a normal rate.  Causes of fetal growth restriction  Fetal growth restriction happens when a baby doesn't get enough oxygen and nutrition in the uterus. It's also called intrauterine growth restriction. Possible causes are certain health problems in the mother such as:  High blood pressure  Heart disease  Kidney disease  Other possible causes include the baby having:  A genetic disorder  An infection  This condition is much more likely if the mother:  Smokes  Drinks  Abuses drugs  Is pregnant with more than one baby  Diagnosing fetal growth restriction       Your healthcare provider may use ultrasound exams to check your baby's growth.     During routine visits, you and your baby are closely watched. This is done with ultrasound tests. Also, the height of your uterus (fundal height) is measured. A baby with fetal growth restriction will have smaller ultrasound and fundal height measurements. Doppler ultrasound is used to measure how well the placenta is working. You may have a test (amniocentesis) to look for a genetic problem or infection. This tests a fluid sample taken from around the baby (amniotic fluid).  Treating fetal growth restriction  The growth of a baby with fetal growth restriction will continue to be closely watched. Any treatment depends on the cause. If the mother  smokes, drinks, or uses drugs, stopping is vital. In other cases, treatments may include:  Bed rest. This helps increase blood flow to the placenta.  Taking care of yourself. Eat a healthy, well-balanced diet with a calorie intake that is advised for a pregnant woman. Keep all your prenatal visits with your healthcare provider.  Medicines. These treat health problems such as high blood pressure.  Early delivery. This may be needed if the baby’s health is in danger.  Talk with your healthcare provider about the best treatment for you and your baby.  Long-term concerns  A baby diagnosed with fetal growth restriction may have health problems after birth. These include trouble fighting infections or keeping a normal body temperature. With treatment and close follow-up, babies may catch up in growth. In some cases, babies have long-term health problems. Your healthcare provider can tell you more.    After a Vaginal Birth  After having a baby, your body may be very tired. It can take time to recover from a vaginal delivery. You may stay in the hospital or birth center from 1 to 4 days. In some cases, you may be able to go home the same day.    Right after the delivery  Your temperature and blood pressure will be taken until they are stable. A nurse or other healthcare provider will watch you as you rest. You may have afterbirth pains. These are cramps caused by the uterus shrinking. Sanitary pads are used to soak up the discharge of the uterine lining. To make sure that you aren’t bleeding too much, the pad will be checked. And the firmness of your uterus will be checked. To do this, a nurse will gently push down on your stomach. If you had anesthesia, you’ll be watched closely until you can feel and move your toes. If you have pain between your vagina and anus (perineal pain), an ice pack can help.  [place] care  While still in the hospital or birth center, you’ll learn how to hold and feed your baby. You will also be  given instructions on how to care for your baby. This includes bathing and feeding.   Getting ready to go home  You may be anxious to go home as soon as possible. Before you and your baby go home, a healthcare provider will check to be sure you are healthy enough to take care of your baby and yourself. You’re ready to go home when:  You can walk to the bathroom and use the bathroom without help.  You can eat solid food and swallow pills (if needed).  You have no sign of infection or other health problems, including fever.   You have adequate pain control.  Your vaginal bleeding isn't heavy.  You are able to care for your  and are emotionally stable.  Before going home, you’ll be given written instructions for home self-care after vaginal delivery. Follow these instructions carefully. If you have questions or concerns, talk about them now.  If you have stitches  You may have received stitches in the skin near your vagina. The stitches might have closed an incision that enlarged the opening of your vagina (episiotomy). Or you may have needed stitches to repair torn skin. Either way, your stitches should dissolve in weeks. Until then, it's important to keep the stitches clean. You can help reduce mild pain, aid healing, and reduce your risk of infection. These tips can help:  Gently wipe from front to back after you urinate or have a bowel movement.  After wiping, spray warm water on the area. Or you can have a sitz bath. This means sitting in a tub with a few inches of water in it. Then pat the area dry or use a hairdryer on a cool setting.  Don't use soap or any solution except water on the area.  You can take a shower unless told not to.  Change sanitary pads at least every 2 to 4 hours.  Place cold or heat packs on the area as directed by your healthcare providers or nurses. Keep a thin towel between the pack and your skin.  Sit on firm seats so the stitches pull less.   follow-up  Schedule a   follow-up exam with your healthcare provider for about 6 weeks after delivery. During this exam, your uterus and vaginal area will be checked. Contact your healthcare provider if you think you or your baby are having any problems.  When to call your healthcare provider  Call your healthcare provider right away if you have:  A fever of 100.4° F ( 38.0°C) or higher, or as directed by your provider  Bleeding that needs a new sanitary pad after an hour, or large blood clots  Pain in your vagina that gets worse and isn't eased with medicine  Swelling, discharge, or more pain from vaginal tear or episiotomy  Burning, pain, red streaks, or lumpy areas in your breasts that may occur with flu-like symptoms  Cracks, blisters, or blood on your nipples  Burning or pain when you urinate  Nausea or vomiting  Dizziness or fainting  Feelings of extreme sadness or anxiety, or a feeling that you don’t want to be with your baby  Belly pain that isn’t eased with medicine  Vaginal discharge that has a bad odor  No bowel movement for 5 days  Painful urination, or inability to control urination  Redness, warmth, or pain in the lower leg  Chest pain

## 2022-06-28 NOTE — PROGRESS NOTES
Patient educated on discharge orders, education and follow up appointments. VSS, no signs of distress, all questions answered, discharge papers signed and scanned.

## 2022-06-28 NOTE — DISCHARGE SUMMARY
Discharge Summary:      Dolores Marques    Admit Date:   2022  Discharge Date:  2022     Admitting diagnosis:  Intrauterine pregnancy [Z34.90], PPROM,  Birth   Discharge Diagnosis: Status post vaginal, spontaneous.  Pregnancy Complications: premature labor, PPROM, possible placental abruption   Tubal Ligation:  no        History:  History reviewed. No pertinent past medical history.  OB History    Para Term  AB Living   2 2 1 1   2   SAB IAB Ectopic Molar Multiple Live Births           0 2      # Outcome Date GA Lbr Po/2nd Weight Sex Delivery Anes PTL Lv   2  22 32w2d / 00:10  M Vag-Spont EPI Y SHALA   1 Term 17    M Vag-Spont EPI  SHALA        Patient has no known allergies.  There are no problems to display for this patient.       Hospital Course:   32 y.o. , now para 2, was admitted with the above mentioned diagnosis, underwent Active Labor, vaginal, spontaneous. Patient postpartum course was unremarkable, with progressive advancement in diet , ambulation and toleration of oral analgesia. Patient without complaints today and desires discharge.      Vitals:    22 0601 22 0955 22 1359 22 1800   BP: 114/68 108/76 126/79 106/70   Pulse: 71 99 73 86   Resp: 18 18 16 16   Temp: 37.2 °C (99 °F) 36.6 °C (97.9 °F) 36.6 °C (97.9 °F) 36.5 °C (97.7 °F)   TempSrc: Temporal Temporal Temporal Temporal   SpO2: 99% 98% 99% 98%   Weight:       Height:           Current Facility-Administered Medications   Medication Dose   • oxytocin (PITOCIN) infusion (for post delivery)  125 mL/hr   • lactated ringers infusion     • docusate sodium (COLACE) capsule 100 mg  100 mg   • ibuprofen (MOTRIN) tablet 800 mg  800 mg   • acetaminophen (TYLENOL) tablet 1,000 mg  1,000 mg   • tetanus-dipth-acell pertussis (Tdap) inj 0.5 mL  0.5 mL   • measles, mumps and rubella vaccine (MMR) injection 0.5 mL  0.5 mL   • miSOPROStol (CYTOTEC) tablet 600 mcg  600 mcg   •  methylergonovine (METHERGINE) injection 0.2 mg  0.2 mg   • magnesium hydroxide (MILK OF MAGNESIA) suspension 30 mL  30 mL   • oxyCODONE immediate-release (ROXICODONE) tablet 5 mg  5 mg   • amoxicillin (AMOXIL) capsule 250 mg  250 mg   • prenatal plus vitamin (STUARTNATAL 1+1) 27-1 MG tablet 1 Tablet  1 Tablet   • doxylamine (UNISOM) tablet 25 mg  25 mg   • ondansetron (ZOFRAN) syringe/vial injection 4 mg  4 mg   • Icy Hot Balm Extra Strength 7.6-29 % topical ointment         Exam:  Breast Exam: negative  Abdomen: Abdomen soft, non-tender. BS normal. No masses,  No organomegaly  Fundus Non Tender: yes  Incision: none  Perineum: perineum intact, well approximated, no complaints   Extremity: extremities, peripheral pulses and reflexes normal     Labs:  Recent Labs     06/25/22  0745 06/26/22  0559   WBC 17.3* 12.3*   RBC 5.16 4.21   HEMOGLOBIN 13.9 11.3*   HEMATOCRIT 43.1 34.8*   MCV 83.5 82.7   MCH 26.9* 26.8*   MCHC 32.3* 32.5*   RDW 42.3 41.1   PLATELETCT 199 185   MPV 11.5 11.1        Activity:   Discharge to home  Pelvic Rest x 6 weeks    Assessment:  Normal postpartum course  Baby in NICU  Pumping  Bleeding light   Pain well managed with Ibuprofen  + BM  Voiding   Patient is Rh positive and does not require Rhogam    Follow up:   OB in [place] in 6 weeks    Discharge Meds:   Current Outpatient Medications   Medication Sig Dispense Refill   • ibuprofen (MOTRIN) 600 MG Tab Take 1 Tablet by mouth every 6 hours as needed for Moderate Pain. 30 Tablet 1       QUIN Yanes, ADONISM    Total time: 30 minutes

## 2022-06-28 NOTE — CARE PLAN
The patient is Stable - Low risk of patient condition declining or worsening    Shift Goals  Clinical Goals: pain management, ambulation, visit  infant in NICU  Patient Goals: pain control; rest  Family Goals: support    Progress made toward(s) clinical / shift goals:  patient has not needed any pain medication    Patient is not progressing towards the following goals: N/A